# Patient Record
Sex: FEMALE | Race: WHITE
[De-identification: names, ages, dates, MRNs, and addresses within clinical notes are randomized per-mention and may not be internally consistent; named-entity substitution may affect disease eponyms.]

---

## 2020-06-04 PROBLEM — Z00.00 ENCOUNTER FOR PREVENTIVE HEALTH EXAMINATION: Status: ACTIVE | Noted: 2020-06-04

## 2020-06-08 ENCOUNTER — APPOINTMENT (OUTPATIENT)
Dept: NEPHROLOGY | Facility: CLINIC | Age: 70
End: 2020-06-08
Payer: MEDICARE

## 2020-06-08 ENCOUNTER — APPOINTMENT (OUTPATIENT)
Dept: NEPHROLOGY | Facility: CLINIC | Age: 70
End: 2020-06-08

## 2020-06-08 DIAGNOSIS — E66.9 OBESITY, UNSPECIFIED: ICD-10-CM

## 2020-06-08 DIAGNOSIS — Z78.9 OTHER SPECIFIED HEALTH STATUS: ICD-10-CM

## 2020-06-08 DIAGNOSIS — Z87.440 PERSONAL HISTORY OF URINARY (TRACT) INFECTIONS: ICD-10-CM

## 2020-06-08 DIAGNOSIS — Z86.2 PERSONAL HISTORY OF DISEASES OF THE BLOOD AND BLOOD-FORMING ORGANS AND CERTAIN DISORDERS INVOLVING THE IMMUNE MECHANISM: ICD-10-CM

## 2020-06-08 DIAGNOSIS — Z87.39 PERSONAL HISTORY OF OTHER DISEASES OF THE MUSCULOSKELETAL SYSTEM AND CONNECTIVE TISSUE: ICD-10-CM

## 2020-06-08 DIAGNOSIS — Z80.1 FAMILY HISTORY OF MALIGNANT NEOPLASM OF TRACHEA, BRONCHUS AND LUNG: ICD-10-CM

## 2020-06-08 DIAGNOSIS — Z82.49 FAMILY HISTORY OF ISCHEMIC HEART DISEASE AND OTHER DISEASES OF THE CIRCULATORY SYSTEM: ICD-10-CM

## 2020-06-08 DIAGNOSIS — G62.9 POLYNEUROPATHY, UNSPECIFIED: ICD-10-CM

## 2020-06-08 DIAGNOSIS — R79.89 OTHER SPECIFIED ABNORMAL FINDINGS OF BLOOD CHEMISTRY: ICD-10-CM

## 2020-06-08 DIAGNOSIS — E78.5 HYPERLIPIDEMIA, UNSPECIFIED: ICD-10-CM

## 2020-06-08 DIAGNOSIS — E03.9 HYPOTHYROIDISM, UNSPECIFIED: ICD-10-CM

## 2020-06-08 DIAGNOSIS — Z72.3 LACK OF PHYSICAL EXERCISE: ICD-10-CM

## 2020-06-08 PROCEDURE — 99204 OFFICE O/P NEW MOD 45 MIN: CPT | Mod: 95

## 2020-06-08 RX ORDER — RAMIPRIL 5 MG/1
CAPSULE ORAL
Refills: 0 | Status: ACTIVE | COMMUNITY

## 2020-06-08 RX ORDER — GABAPENTIN 600 MG/1
600 TABLET, COATED ORAL AT BEDTIME
Refills: 0 | Status: ACTIVE | COMMUNITY

## 2020-06-08 RX ORDER — CHLORHEXIDINE GLUCONATE 4 %
1000 LIQUID (ML) TOPICAL DAILY
Refills: 0 | Status: ACTIVE | COMMUNITY

## 2020-06-08 RX ORDER — CLONIDINE HYDROCHLORIDE 0.1 MG/1
0.1 TABLET ORAL TWICE DAILY
Refills: 0 | Status: ACTIVE | COMMUNITY

## 2020-06-08 RX ORDER — LEVOTHYROXINE SODIUM 75 UG/1
75 TABLET ORAL DAILY
Refills: 0 | Status: ACTIVE | COMMUNITY

## 2020-06-08 RX ORDER — OXYCODONE HYDROCHLORIDE 10 MG/1
10 TABLET, FILM COATED, EXTENDED RELEASE ORAL DAILY
Refills: 0 | Status: ACTIVE | COMMUNITY

## 2020-06-08 RX ORDER — ATORVASTATIN CALCIUM 40 MG/1
40 TABLET, FILM COATED ORAL DAILY
Refills: 0 | Status: ACTIVE | COMMUNITY

## 2020-06-08 NOTE — CONSULT LETTER
[Dear  ___] : Dear  [unfilled], [Consult Letter:] : I had the pleasure of evaluating your patient, [unfilled]. [Please see my note below.] : Please see my note below. [Consult Closing:] : Thank you very much for allowing me to participate in the care of this patient.  If you have any questions, please do not hesitate to contact me. [Sincerely,] : Sincerely, [FreeTextEntry3] : Dionicio

## 2020-06-08 NOTE — ASSESSMENT
[FreeTextEntry1] : \par Yamilex Chan is a 70-year old woman who has a past medical history significant for diabetes mellitus since 1987 (without diabetic retinopathy though with peripheral neuropathy), hypertension, dyslipidemia, obesity, and sarcoidosis diagnosed on lung biopsy in the 1980's.  I saw Ms. Chan in 2015 for BUN/creatinine levels of 28/1.82.  At the time I commented that I suspected the elevated serum creatinine was due to a combination of her ramipril and furosemide.  She ran out of furosemide, did not take it for a month and repeat studies demonstrated BUN/creatinine levels of 22/1.25 (01/28/2015).  At the time we did a renal ultrasound which demonstrated small appearing kidneys with chronic changes and a hypoechoic area of the upper pole of the right kidney.  Radiology recommended an MRI or CT with and without intravenous contrast.  This was not done.  She reports having a repeat ultrasound of the kidneys, the results of which are not available to me at the moment.\par \par Ms. Chan is here regarding the following BUN/creatinine trend: .27/1.4 (04/12/2019), 27/1.5 (08/14/2019), 20/1.4 (10/15/2019), 24/1.8 (05/21/2020).  Ms. Chan' medications have not changed significantly between October and May.  Amlodipine was started.  Medical marijuana was started.  Clonidine and furosemide were started on 05/21/2020 and do not account for the increase in serum creatinine.  She has taken ibuprofen every other day for years.  She increased the dose from 400 mg every other day to 600 mg every other day in the last 6-9 months. \par \par Her blood pressures have recently been difficult to control and she was recently started on clonidine (and furosemide).  \par \par IMPRESSION:\par \par (1) Stage II- III CKD at baseline (chronic changes on prior renal ultrasound, contribution from ACE I)\par (2) Recent acute increase in the serum creatinine.  I am unsure if this is related to the increase in ibuprofen dose, variations in the blood pressures, BONITA, or other etiology such as a progression of her underlying CKD or effects of long term ibuprofen use.\par (3) Hypertension.  I am unsure if there is underlying renal artery stenosis.  \par (4) Proteinuria.  This has not been quantitated.\par (5) UTI?  Ms. Chan is currently asymptomatic.\par \par RECOMMENDATIONS:\par \par (1) I asked Ms. Chan to stop taking ibuprofen. \par (2) I made no other changes to the medication regimen.\par (3) Repeat renal ultrasound has been ordered. \par (4) I ordered the lab tests noted below. \par (5) I asked Mr. Chan to purchase a blood pressure machine and check her blood pressures twice daily. \par

## 2020-06-08 NOTE — HISTORY OF PRESENT ILLNESS
[Home] : at home, [unfilled] , at the time of the visit. [Medical Office: ([facility])___] : at the medical office located in  [Verbal consent obtained from patient] : the patient, [unfilled] [FreeTextEntry4] : Sepideh Herbert [FreeTextEntry1] : \patrizia Chan is a 70-year old woman who I last saw in April 2015 for her chronic kidney disease.  Her past medical history is significant for diabetes mellitus since 1987 (without diabetic retinopathy though with peripheral neuropathy), hypertension, dyslipidemia, obesity, and sarcoidosis diagnosed on lung biopsy in the 1980's.  Her BUN/creatinine levels have trended as follows:  22/1.25 (01/28/2015) after stopping furosemide, 28/1.82......., 27/1.4 (04/12/2019), 27/1.5 (08/14/2019), 20/1.4 (10/15/2019), 24/1.8 (05/21/2020).  She is currently taking ramipril 20 mg twice daily and furosemide 20 mg once daily in addition to the medications outlined below.  The ramipril dose has not changed in the past year.  Furosemide was started on 05/20/2020 for ankle edema.  Clonidine 0.1 mg twice daily was also started on that day.  Ms. Chan takes She takes ibuprofen 600 mg around once every other day.  Currently has some lower back pain though otherwise is without complaints.

## 2020-06-08 NOTE — REVIEW OF SYSTEMS
[Lower Ext Edema] : lower extremity edema [As Noted in HPI] : as noted in HPI [Negative] : Heme/Lymph [Chest Pain] : no chest pain [Palpitations] : no palpitations [Leg Claudication] : no intermittent leg claudication [FreeTextEntry3] : wears eyeglasses.  Denies having diabetic retinopathy [FreeTextEntry5] : swelling in the ankles [FreeTextEntry6] : D [FreeTextEntry8] : nocturia x 1 [de-identified] : "I'm always thirsty".  FSG readings have recently been very high oin the mornings.

## 2020-07-07 ENCOUNTER — APPOINTMENT (OUTPATIENT)
Dept: NEPHROLOGY | Facility: CLINIC | Age: 70
End: 2020-07-07

## 2020-07-20 LAB
ANION GAP SERPL CALC-SCNC: 11 MMOL/L
BUN SERPL-MCNC: 39 MG/DL
CALCIUM SERPL-MCNC: 9 MG/DL
CHLORIDE SERPL-SCNC: 111 MMOL/L
CO2 SERPL-SCNC: 21 MMOL/L
CREAT SERPL-MCNC: 1.95 MG/DL
GLUCOSE SERPL-MCNC: 140 MG/DL
POTASSIUM SERPL-SCNC: 5.5 MMOL/L
SODIUM SERPL-SCNC: 143 MMOL/L

## 2020-07-20 RX ORDER — AMLODIPINE BESYLATE 5 MG/1
5 TABLET ORAL DAILY
Refills: 0 | Status: DISCONTINUED | COMMUNITY
End: 2020-07-20

## 2020-07-20 RX ORDER — FUROSEMIDE 20 MG/1
20 TABLET ORAL DAILY
Refills: 0 | Status: DISCONTINUED | COMMUNITY
End: 2020-07-20

## 2020-07-21 ENCOUNTER — APPOINTMENT (OUTPATIENT)
Dept: NEPHROLOGY | Facility: CLINIC | Age: 70
End: 2020-07-21
Payer: MEDICARE

## 2020-07-21 DIAGNOSIS — N17.9 ACUTE KIDNEY FAILURE, UNSPECIFIED: ICD-10-CM

## 2020-07-21 DIAGNOSIS — E87.5 HYPERKALEMIA: ICD-10-CM

## 2020-07-21 PROCEDURE — 99214 OFFICE O/P EST MOD 30 MIN: CPT | Mod: 95

## 2020-07-21 RX ORDER — MULTIVIT-MIN/FA/LYCOPEN/LUTEIN .4-300-25
TABLET ORAL
Refills: 0 | Status: ACTIVE | COMMUNITY

## 2020-07-21 NOTE — REVIEW OF SYSTEMS
[Lower Ext Edema] : lower extremity edema [As Noted in HPI] : as noted in HPI [Negative] : Heme/Lymph [Chest Pain] : no chest pain [Palpitations] : no palpitations [Leg Claudication] : no intermittent leg claudication [FreeTextEntry2] : dry mouth [FreeTextEntry3] : wears eyeglasses.  Denies having diabetic retinopathy [FreeTextEntry5] : ankle edema [de-identified] : bilateral toe numbness has been present for years. [FreeTextEntry7] : diarrhea with headache and nausea a couple of weeks ago, lasted 2.5 days and resolved. [FreeTextEntry8] : nocturia x 1 [de-identified] : "I'm always thirsty".  FSG readings have recently been "oretty good", running from  mg/dL in the mornings.

## 2020-07-21 NOTE — ASSESSMENT
[FreeTextEntry1] : \par Yamilex Chan is a 70-year old woman who has a past medical history significant for diabetes mellitus since 1987 (without diabetic retinopathy though with peripheral neuropathy), hypertension, dyslipidemia, obesity, and sarcoidosis diagnosed on lung biopsy in the 1980's.  I saw Ms. Chan in 2015 for BUN/creatinine levels of 28/1.82.  At the time I commented that I suspected the elevated serum creatinine was due to a combination of her ramipril and furosemide.  She ran out of furosemide, did not take it for a month and repeat studies demonstrated BUN/creatinine levels of 22/1.25 (01/28/2015).  At the time we did a renal ultrasound which demonstrated small appearing kidneys with chronic changes and a hypoechoic area of the upper pole of the right kidney.  Radiology recommended an MRI or CT with and without intravenous contrast.  This was not done.  \par \par Ms. Chan is here regarding the following BUN/creatinine trend: .27/1.4 (04/12/2019), 27/1.5 (08/14/2019), 20/1.4 (10/15/2019), 24/1.8 (05/21/2020), 35/2.23 (06/25/2020). Ms. Chan' medications have not changed significantly between October and May.  Amlodipine was started.  Medical marijuana was started.  Clonidine and furosemide were started on 05/21/2020 and do not account for the increase in serum creatinine in May, though are likely contributors to the serum creatinine of 2.23 mg/dL in June 2020.   She had been taking  ibuprofen every other day for years.  She had increased the dose from 400 mg every other day to 600 mg every other day in the last 6-9 months though discontinued it following the recent elevated serum creatinine. Repeat BUN/creatinine levels from 07/17/2020 were 39/1.95.  \par \par Her blood pressures have recently been difficult to control and she was recently started on clonidine.  The recent blood pressures since starting clonidine have been around 127-145/65-67.  It has improved. \par \par IMPRESSION:\par \par (1) Stage II- III CKD with NANDO (chronic changes on prior renal ultrasound, contribution from  NSAIDS, ACE I).  The urine protein to creatinine ratio is 1.09 grams per gram.  The urinalysis demonstrated a few white cells with 2+ bacteria (she has been asymptomatic).  The renal ultrasound of 06/24/2020 demonstrated a right kidney measuring 10.1 cm and left kidney measuring 8.8 cm with bilateral cortical thinning with lobulation and bilateral increased cortical echogenicity.  The left kidney is small (it should be larger than the right kidney).  I suspect that the underlying CKD is related to the use of NSAIDS for years, +/- renal artery stenosis versus embolic/vascular disease, with contributions from the ACE inhibitor. I can not rule out a developing proteinuric renal disease. \par \par (2) NANDO.  Likely related to the addition of furosemide and use of ibuprofen.  Currently improving off of these medications. \par \par (3) Hypertension.  I am unsure if there is underlying renal artery stenosis.  The small left kidney suggests that this is a possibility.  The secondary workup has included serum aldosterone (7.6 ng/dL). This is not  hyper aldosteronism.\par \par (4) Renal mass.  This was not present on the recent ultrasound. \par \par (5) Hyperkalemia.  Serum potassium was 5.5 meq per liter (glucose 140 mg/dL).\par \par PLAN:\par \par (1) Limit high potassium foods. We reviewed them. \par (2) Repeat BMP in 2 weeks.\par (3) Follow the blood pressures at home.\par (4) Continue the current medication regimen.\par \par If the repeat labs are stable or improved- Ms. Chan will return to me in 3 months with a CMP, CBC, phosphorous, PRA with aldosterone, urinalysis, and repeat random urine for total protein and creatinine in addition to a nephrotic range proteinuria workup.

## 2020-07-21 NOTE — CONSULT LETTER
[Dear  ___] : Dear  [unfilled], [Consult Letter:] : I had the pleasure of evaluating your patient, [unfilled]. [Please see my note below.] : Please see my note below. [Sincerely,] : Sincerely, [Consult Closing:] : Thank you very much for allowing me to participate in the care of this patient.  If you have any questions, please do not hesitate to contact me. [FreeTextEntry3] : Dionicio

## 2020-07-21 NOTE — HISTORY OF PRESENT ILLNESS
[Medical Office: ([facility])___] : at the medical office located in  [Verbal consent obtained from patient] : the patient, [unfilled] [Home] : at home, [unfilled] , at the time of the visit. [FreeTextEntry4] : Sepideh Herbert [FreeTextEntry1] : \par Yamilex Chan is a 70-year old woman who I  saw in April 2015 for her chronic kidney disease.  Her past medical history is significant for diabetes mellitus since 1987 (without diabetic retinopathy though with peripheral neuropathy), hypertension, dyslipidemia, obesity, and sarcoidosis diagnosed on lung biopsy in the 1980's.  Her BUN/creatinine levels have trended as follows:  22/1.25 (01/28/2015) after stopping furosemide, 28/1.82......., 27/1.4 (04/12/2019), 27/1.5 (08/14/2019), 20/1.4 (10/15/2019), 24/1.8 (05/21/2020).  She returned to me in June 2020 because of the increase in the serum creatinine.\par \par She is currently taking ramipril 20 mg twice daily and furosemide 20 mg once daily in addition to the medications outlined below.  The ramipril dose has not changed in the past year.  Furosemide was started on 05/20/2020 for ankle edema.  Clonidine 0.1 mg twice daily was also started on that day.  Ms. Chan was taking   ibuprofen 600 mg around once every other day for her lower back pain.  Following a repeat serum creatinine of 2.23 mg/dL, the NSAIDS and furosemide were discontinued.\par \par I am seeing Ms. Chan today via telehealth video conferencing.  She is feeling well.  Her right ankle is "a bit swollen".  She is no longer taking furosemide.  She has lost a couple of pounds.  She is taking no ibuprofen. She is using medical marijuana.

## 2020-10-23 RX ORDER — DULOXETINE HYDROCHLORIDE 20 MG/1
20 CAPSULE, DELAYED RELEASE ORAL DAILY
Refills: 0 | Status: DISCONTINUED | COMMUNITY
End: 2020-10-23

## 2020-10-27 ENCOUNTER — APPOINTMENT (OUTPATIENT)
Dept: NEPHROLOGY | Facility: CLINIC | Age: 70
End: 2020-10-27
Payer: MEDICARE

## 2020-10-27 DIAGNOSIS — I10 ESSENTIAL (PRIMARY) HYPERTENSION: ICD-10-CM

## 2020-10-27 DIAGNOSIS — R80.9 PROTEINURIA, UNSPECIFIED: ICD-10-CM

## 2020-10-27 DIAGNOSIS — E11.9 TYPE 2 DIABETES MELLITUS W/OUT COMPLICATIONS: ICD-10-CM

## 2020-10-27 DIAGNOSIS — N18.30 CHRONIC KIDNEY DISEASE, STAGE 3 UNSPECIFIED: ICD-10-CM

## 2020-10-27 PROCEDURE — 99214 OFFICE O/P EST MOD 30 MIN: CPT | Mod: 95

## 2020-10-27 RX ORDER — SEMAGLUTIDE 0.68 MG/ML
INJECTION, SOLUTION SUBCUTANEOUS
Refills: 0 | Status: ACTIVE | COMMUNITY

## 2020-10-27 RX ORDER — INSULIN GLARGINE 300 U/ML
300 INJECTION, SOLUTION SUBCUTANEOUS DAILY
Refills: 0 | Status: ACTIVE | COMMUNITY

## 2020-10-27 NOTE — ASSESSMENT
[FreeTextEntry1] : \par Yamilex Chan is a 70-year old woman who has a past medical history significant for diabetes mellitus since 1987 (without diabetic retinopathy though with peripheral neuropathy), hypertension, dyslipidemia, obesity, and sarcoidosis diagnosed on lung biopsy in the 1980's.  I saw Ms. Chan in 2015 for BUN/creatinine levels of 28/1.82.  At the time I commented that I suspected the elevated serum creatinine was due to a combination of her ramipril and furosemide.  She ran out of furosemide, did not take it for a month and repeat studies demonstrated BUN/creatinine levels of 22/1.25 (01/28/2015).  At the time we did a renal ultrasound which demonstrated small appearing kidneys with chronic changes and a hypoechoic area of the upper pole of the right kidney.  Radiology recommended an MRI or CT with and without intravenous contrast.  This was not done.  \par \par Ms. Chan'  BUN/creatinine levels have trended as follows: .27/1.4 (04/12/2019), 27/1.5 (08/14/2019), 20/1.4 (10/15/2019), 24/1.8 (05/21/2020), 35/2.23 (06/25/2020),  39/1.95 (07/17/2020), 31/1.8 (08/11/2020).  I suspect the increase in the serum creatinine to 2.23 mg per dL was related to use of ibuprofen and furosemide.  Both have been stopped since.  Remember, Ms. Chan did take NSAIDS for years.  This may be a contributor to the underlying CKD.\par \par Her blood pressures have recently been "good, ranging from 111/62 to 145/65.   \par \par IMPRESSION:\par \par (1) Stage III CKD with NANDO (chronic changes on prior renal ultrasound, contribution from  NSAIDS, ACE I).  The last urine protein to creatinine ratio was1.09 grams per gram.  The urinalysis demonstrated a few white cells with 2+ bacteria (she has been asymptomatic).  The renal ultrasound of 06/24/2020 demonstrated a right kidney measuring 10.1 cm and left kidney measuring 8.8 cm with bilateral cortical thinning with lobulation and bilateral increased cortical echogenicity.  The left kidney is small (it should be larger than the right kidney).  I suspect that the underlying CKD is related to the use of NSAIDS for years, +/- renal artery stenosis versus embolic/vascular disease, with contributions from the ACE inhibitor. I can not rule out a developing proteinuric renal disease or a contribution from the long standing diabetes mellitus. \par \par (2) NANDO.  Resolved.\par \par (3) Hypertension.  I am unsure if there is underlying renal artery stenosis.  The small left kidney suggests that this is a possibility.  The secondary workup has included serum aldosterone (7.6 ng/dL). This is not  hyper aldosteronism.\par \par (4) Renal mass.  This was not present on the recent ultrasound. \par \par (5) Hyperkalemia.  Serum potassium was 5.4 meq per liter (glucose 124 mg/dL).\par \par (6) DM.  Poor control this past summer (HbA1c 7.9%).  This has reportedly improved. \par \par PLAN:\par \par (1) I made no changes in the medication regimen.\par (2) See Dr. Aleman.  Ms. Chan has been having chronic nausea which may be related to the use of Ozempic. \par (3) Follow the blood pressures at home.  Ms. Chan should have her blood pressure monitored checked for accuracy. \par \par Ms. Chan will return to me in 6 months with a CMP, CBC, phosphorous, PRA with aldosterone, urinalysis, and repeat random urine for total protein and creatinine in addition to a nephrotic range proteinuria workup.

## 2020-10-27 NOTE — REVIEW OF SYSTEMS
[Chest Pain] : no chest pain [Palpitations] : no palpitations [Leg Claudication] : no intermittent leg claudication [Lower Ext Edema] : lower extremity edema [Joint Pain] : joint pain [Itching] : itching [As Noted in HPI] : as noted in HPI [Negative] : Heme/Lymph [FreeTextEntry3] : wears eyeglasses.  Denies having diabetic retinopathy [FreeTextEntry8] : nocturia x 1 [de-identified] : Itching "all over" [de-identified] : bilateral toe numbness has been present for years. [de-identified] : .

## 2020-10-27 NOTE — HISTORY OF PRESENT ILLNESS
[Home] : at home, [unfilled] , at the time of the visit. [Medical Office: ([facility])___] : at the medical office located in  [Verbal consent obtained from patient] : the patient, [unfilled] [FreeTextEntry4] : Sepideh Herbert [FreeTextEntry1] : \par Yamilex Chan is a 70-year old woman who I  saw in April 2015 for her chronic kidney disease.  Her past medical history is significant for diabetes mellitus since 1987 (without diabetic retinopathy though with peripheral neuropathy), hypertension, dyslipidemia, obesity, and sarcoidosis diagnosed on lung biopsy in the 1980's.  Her BUN/creatinine levels have trended as follows:  22/1.25 (01/28/2015) after stopping furosemide, 28/1.82......., 27/1.4 (04/12/2019), 27/1.5 (08/14/2019), 20/1.4 (10/15/2019), 24/1.8 (05/21/2020).  She returned to me in June 2020 because of the increase in the serum creatinine.\par \par I am seeing Ms. Chan via telehealth video monitoring today.  She was  taking ramipril 20 mg twice daily and furosemide 20 mg once daily in addition to the medications outlined below.  The ramipril dose has not changed in over a  year.  Furosemide was started on 05/20/2020 for ankle edema.  Clonidine 0.1 mg twice daily was also started on that day.  Ms. Chan was taking  ibuprofen 600 mg around once every other day for her lower back pain.  Following a repeat serum creatinine of 2.23 mg/dL, the NSAIDS and furosemide were discontinued.\par \par I am seeing Ms. Chan today via telehealth video conferencing.  She is feeling well. She has been exercising and cut back on food portions. She has lost 20 pounds. She remains off of furosemide.  Her diabetic regimen has been modified.  Her diabetes mellitus is "under control".  She see's Dr. Luz Marina Aleman of endocrinology.   Ms. Chan states that she is "nauseas all the time".  Her new medications include Ozempic and Toujeo. \par

## 2020-10-27 NOTE — CONSULT LETTER
[Dear  ___] : Dear  [unfilled], [Consult Letter:] : I had the pleasure of evaluating your patient, [unfilled]. [Please see my note below.] : Please see my note below. [Consult Closing:] : Thank you very much for allowing me to participate in the care of this patient.  If you have any questions, please do not hesitate to contact me. [Sincerely,] : Sincerely, [FreeTextEntry3] : Dionicio [DrNichole  ___] : Dr. THOMAS

## 2020-12-23 PROBLEM — Z87.440 HISTORY OF URINARY TRACT INFECTION: Status: RESOLVED | Noted: 2020-06-08 | Resolved: 2020-12-23

## 2021-04-26 ENCOUNTER — APPOINTMENT (OUTPATIENT)
Dept: NEPHROLOGY | Facility: CLINIC | Age: 71
End: 2021-04-26

## 2023-10-04 ENCOUNTER — TRANSCRIPTION ENCOUNTER (OUTPATIENT)
Age: 73
End: 2023-10-04

## 2023-10-10 VITALS
HEART RATE: 62 BPM | SYSTOLIC BLOOD PRESSURE: 155 MMHG | TEMPERATURE: 98 F | WEIGHT: 175.05 LBS | HEIGHT: 62 IN | RESPIRATION RATE: 16 BRPM | OXYGEN SATURATION: 100 % | DIASTOLIC BLOOD PRESSURE: 93 MMHG

## 2023-10-10 RX ORDER — CHLORHEXIDINE GLUCONATE 213 G/1000ML
1 SOLUTION TOPICAL ONCE
Refills: 0 | Status: COMPLETED | OUTPATIENT
Start: 2023-10-13 | End: 2023-10-13

## 2023-10-10 NOTE — H&P ADULT - PSYCHIATRIC
Expected Date Of Service: 09/14/2023 Bill For Surgical Tray: no Billing Type: Third-Party Bill Performing Laboratory: 0 normal/normal affect/alert and oriented x3/normal behavior

## 2023-10-10 NOTE — H&P ADULT - ASSESSMENT
72 yo F with PMHx of HTN, hypothyroidism, DM2, Afib (on Xarelto, last dose 10/10), hypothyroidism, CKD stage IV, sarcoidosis (in remission x30 years), and chronic back pain who presented to [facility] cardiac cath lab for high risk PCI/rota in light of known LAD disease and CCS class II symptoms     -ASA 3, Mallampati 3  -VSS  -Pt not on home aspirin; loaded with aspirin 81mg and Plavix 600mg PO x1.  H&h 10.4/33.8. Denies any recent bleeding.   -Pre cath IVF Hydration: NS 250cc bolus then c/w maintenance NS @ 75cc/hr x 2hrs. Euvolemic on exam.   -Pre cath consented  -Suitable for moderate sedation    Risks & benefits of procedure and alternative therapy have been explained to the patient including but not limited to: allergic reaction, bleeding w/possible need for blood transfusion, infection, renal and vascular compromise, limb damage, arrhythmia, stroke, vessel dissection/perforation, Myocardial infarction, emergent CABG. Informed consent obtained and in chart.             72 yo F with PMHx of HTN, hypothyroidism, DM2, Afib (on Xarelto, last dose 10/10), hypothyroidism, CKD stage IV, sarcoidosis (in remission x30 years), and chronic back pain who presented to [facility] cardiac cath lab for high risk PCI/rota in light of known LAD disease and CCS class II symptoms     -ASA 3, Mallampati 3  -VSS  -Pt not on home aspirin; loaded with aspirin 81mg and Plavix 600mg PO x1.  H&h 10.4/33.8. Denies any recent bleeding.   -Pre cath IVF Hydration: NS 250cc bolus then c/w maintenance NS @ 75cc/hr x 2hrs. Euvolemic on exam.   -Pre cath consented  -Suitable for moderate sedation    Risks & benefits of procedure and alternative therapy have been explained to the patient including but not limited to: allergic reaction, bleeding w/possible need for blood transfusion, infection, renal and vascular compromise, limb damage, arrhythmia, stroke, vessel dissection/perforation, Myocardial infarction, emergent CABG. Informed consent obtained and in chart.             74 yo F with PMHx of HTN, hypothyroidism, DM2, Afib (on Xarelto, last dose 10/10), hypothyroidism, CKD stage IV, sarcoidosis (in remission x30 years), and chronic back pain who presented to [facility] cardiac cath lab for high risk PCI/rota in light of known LAD disease and CCS class II symptoms     -ASA 3, Mallampati 3  -VSS  -Pt not on home aspirin; loaded with aspirin 81mg and Plavix 600mg PO x1.  H&h 10.4/33.8. Denies any recent bleeding.   -Pre cath IVF Hydration: NS 250cc bolus then c/w maintenance NS @ 75cc/hr x 2hrs. Euvolemic on exam.   -Pre cath consented  -Suitable for moderate sedation    Risks & benefits of procedure and alternative therapy have been explained to the patient including but not limited to: allergic reaction, bleeding w/possible need for blood transfusion, infection, renal and vascular compromise, limb damage, arrhythmia, stroke, vessel dissection/perforation, Myocardial infarction, emergent CABG. Informed consent obtained and in chart.             72 yo F with PMHx of HTN, hypothyroidism, DM2, Afib (on Xarelto, last dose 10/10), hypothyroidism, CKD stage IV (baseline Cr 2-2.2), sarcoidosis (in remission x30 years), and chronic back pain who presented to [facility] cardiac cath lab for high risk PCI/rota in light of known LAD disease and CCS class II symptoms     -ASA 3, Mallampati 3  -VSS  -Pt not on home aspirin; loaded with aspirin 81mg and Plavix 600mg PO x1.  H&h 10.4/33.8 (at baseline). Denies any recent bleeding.   -Pre cath IVF Hydration: NS 250cc bolus then c/w maintenance NS @ 75cc/hr x 2hrs. Euvolemic on exam.   -Pre cath consented  -Suitable for moderate sedation    Risks & benefits of procedure and alternative therapy have been explained to the patient including but not limited to: allergic reaction, bleeding w/possible need for blood transfusion, infection, renal and vascular compromise, limb damage, arrhythmia, stroke, vessel dissection/perforation, Myocardial infarction, emergent CABG. Informed consent obtained and in chart.             74 yo F with PMHx of HTN, hypothyroidism, DM2, Afib (on Xarelto, last dose 10/10), hypothyroidism, CKD stage IV (baseline Cr 2-2.2), sarcoidosis (in remission x30 years), and chronic back pain who presented to [facility] cardiac cath lab for high risk PCI/rota in light of known LAD disease and CCS class II symptoms     -ASA 3, Mallampati 3  -VSS  -Pt not on home aspirin; loaded with aspirin 81mg and Plavix 600mg PO x1.  H&h 10.4/33.8 (at baseline). Denies any recent bleeding.   -Pre cath IVF Hydration: NS 250cc bolus then c/w maintenance NS @ 75cc/hr x 2hrs. Euvolemic on exam.   -Pre cath consented  -Suitable for moderate sedation    Risks & benefits of procedure and alternative therapy have been explained to the patient including but not limited to: allergic reaction, bleeding w/possible need for blood transfusion, infection, renal and vascular compromise, limb damage, arrhythmia, stroke, vessel dissection/perforation, Myocardial infarction, emergent CABG. Informed consent obtained and in chart.             74 yo F with PMHx of HTN, hypothyroidism, DM2, Afib (on Xarelto, last dose 10/10), hypothyroidism, CKD stage IV (baseline Cr 2-2.2), sarcoidosis (in remission x30 years), and chronic back pain who presented to [facility] cardiac cath lab for high risk PCI/rota in light of known LAD disease and CCS class II symptoms     -ASA 3, Mallampati 3  -VSS  -Pt not on home aspirin; loaded with aspirin 81mg and Plavix 600mg PO x1.  H&h 10.4/33.8 (at baseline). Denies any recent bleeding.   -Pre cath IVF Hydration: NS 250cc bolus then c/w maintenance NS @ 75cc/hr x 2hrs. Euvolemic on exam.   -Pre cath consented  -Suitable for moderate sedation    Risks & benefits of procedure and alternative therapy have been explained to the patient including but not limited to: allergic reaction, bleeding w/possible need for blood transfusion, infection, renal and vascular compromise, limb damage, arrhythmia, stroke, vessel dissection/perforation, Myocardial infarction, emergent CABG. Informed consent obtained and in chart.             72 yo F with PMHx of HTN, hypothyroidism, DM2, Afib (on Xarelto, last dose 10/10), hypothyroidism, CKD stage IV (baseline Cr 2-2.2), sarcoidosis (in remission x30 years), and chronic back pain who presented to [facility] cardiac cath lab for high risk PCI/rota in light of known LAD disease and CCS class II symptoms     -ASA 3, Mallampati 3  -VSS  -Pt not on home aspirin; loaded with aspirin 81mg and Plavix 600mg PO x1.  H&h 10.4/33.8 (at baseline). Denies any recent bleeding.   -Pre cath IVF Hydration: NS 250cc bolus then c/w maintenance NS @ 150cc/hr x 2hrs for Cr 2.32 as discussed with IF fellow. Euvolemic on exam.   -Pre cath consented  -Suitable for moderate sedation    Risks & benefits of procedure and alternative therapy have been explained to the patient including but not limited to: allergic reaction, bleeding w/possible need for blood transfusion, infection, renal and vascular compromise, limb damage, arrhythmia, stroke, vessel dissection/perforation, Myocardial infarction, emergent CABG. Informed consent obtained and in chart.             74 yo F with PMHx of HTN, hypothyroidism, DM2, Afib (on Xarelto, last dose 10/10), hypothyroidism, CKD stage IV (baseline Cr 2-2.2), sarcoidosis (in remission x30 years), and chronic back pain who presented to [facility] cardiac cath lab for high risk PCI/rota in light of known LAD disease and CCS class II symptoms     -ASA 3, Mallampati 3  -VSS  -Pt not on home aspirin; loaded with aspirin 81mg and Plavix 600mg PO x1.  H&h 10.4/33.8 (at baseline). Denies any recent bleeding.   -Pre cath IVF Hydration: NS 250cc bolus then c/w maintenance NS @ 150cc/hr x 2hrs for Cr 2.32 as discussed with IF fellow. Euvolemic on exam.   -Pre cath consented  -Suitable for moderate sedation    Risks & benefits of procedure and alternative therapy have been explained to the patient including but not limited to: allergic reaction, bleeding w/possible need for blood transfusion, infection, renal and vascular compromise, limb damage, arrhythmia, stroke, vessel dissection/perforation, Myocardial infarction, emergent CABG. Informed consent obtained and in chart.             74 yo F with PMHx of HTN, hypothyroidism, DM2, Afib (on Xarelto, last dose 10/10), hypothyroidism, CKD stage IV (baseline Cr 2-2.2), sarcoidosis (in remission x30 years), and chronic back pain who presented to [facility] cardiac cath lab for high risk PCI/rota in light of known LAD disease and CCS class II symptoms     -ASA 3, Mallampati 3  -VSS  -Pt not on home aspirin; loaded with aspirin 81mg and Plavix 600mg PO x1.  H&h 10.4/33.8 (at baseline). Denies any recent bleeding.   -Pre cath IVF Hydration: NS 250cc bolus then c/w maintenance NS @ 150cc/hr x 2hrs for Cr 2.32 as discussed with IF fellow. Euvolemic on exam.   -Pre cath consented  -Suitable for moderate sedation    Risks & benefits of procedure and alternative therapy have been explained to the patient including but not limited to: allergic reaction, bleeding w/possible need for blood transfusion, infection, renal and vascular compromise, limb damage, arrhythmia, stroke, vessel dissection/perforation, Myocardial infarction, emergent CABG. Informed consent obtained and in chart.

## 2023-10-10 NOTE — H&P ADULT - NSICDXPASTMEDICALHX_GEN_ALL_CORE_FT
PAST MEDICAL HISTORY:  Afib     Diabetes     HTN (hypertension)     Hypothyroid     Lower extremity edema     Stage 4 chronic kidney disease      PAST MEDICAL HISTORY:  Afib     Diabetes     HTN (hypertension)     Hypothyroid     Lower extremity edema     Sarcoidosis     Stage 4 chronic kidney disease

## 2023-10-10 NOTE — H&P ADULT - HISTORY OF PRESENT ILLNESS
Cardiologist: Dr. Urrutia  Pharmacy:   Escort:    74 yo F with PMHx of HTN, hypothyroidism, DM2, Afib, hypothyroidism, CKD stage IV, chronic back pain who presented to the [facility] ER complaining of exertional shortness of breath and getting an abnormal stress test with Dr. Urrutia. Pt was admitted and is now s/p C 10/4/23 that showed a heavily calcified LAD lesion with 75% mid stenosis with calcification noted. Pt ___ denies palpitations, orthopnea, PND, leg edema, n/v/d, fever, chills, hematochezia, hematemesis, melena, BRBPR and other symptoms.      In light of pt risk factors, abnormal prior cath and CCS class ____ symptoms, pt was referred to [facility] for high risk PCI/rota with Dr. Calderon.          Cardiologist: Dr. Urrutia  Pharmacy:   Escort:    72 yo F with PMHx of HTN, hypothyroidism, DM2, Afib, hypothyroidism, CKD stage IV, chronic back pain who presented to the [facility] ER complaining of exertional shortness of breath and getting an abnormal stress test with Dr. Urrutia. Pt was admitted and is now s/p C 10/4/23 that showed a heavily calcified LAD lesion with 75% mid stenosis with calcification noted. Pt ___ denies palpitations, orthopnea, PND, leg edema, n/v/d, fever, chills, hematochezia, hematemesis, melena, BRBPR and other symptoms.      In light of pt risk factors, abnormal prior cath and CCS class ____ symptoms, pt was referred to [facility] for high risk PCI/rota with Dr. Calderon.          Cardiologist: Dr. Urrutia  Pharmacy:   Escort:    72 yo F with PMHx of HTN, hypothyroidism, DM2, Afib, hypothyroidism, CKD stage IV, chronic back pain who presented to the [facility] ER complaining of exertional shortness of breath and getting an abnormal stress test with Dr. Urrutia. Pt was admitted and is now s/p C 10/4/23 that showed a heavily calcified LAD lesion with 75% mid stenosis with calcification noted. Pt ___ denies palpitations, orthopnea, PND, leg edema, n/v/d, fever, chills, hematochezia, hematemesis, melena, BRBPR and other symptoms.      In light of pt risk factors, abnormal prior cath and CCS class ____ symptoms, pt was referred to [facility] for high risk PCI/rota with Dr. Calderon.          Cardiologist: Dr. Urrutia  Pharmacy: [place] (in chart)  Escort:     74 yo F with PMHx of HTN, hypothyroidism, DM2, Afib (on Xarelto, last dose 10/10), hypothyroidism, CKD stage IV, sarcoidosis (in remission x30 years), and chronic back pain who presented to the [facility] ER complaining of exertional shortness of breath and getting an abnormal stress test with Dr. Urrutia. Pt was admitted and is now s/p [facility] 10/4/23 that showed a heavily calcified LAD lesion with 75% mid stenosis with calcification noted. Pt denies any palpitations, orthopnea, PND, leg edema, n/v/d, fever, chills, hematochezia, hematemesis, melena, BRBPR and other symptoms.     In light of pt risk factors, abnormal prior cath and CCS class II symptoms, pt was referred to [facility] for high risk PCI/rota with Dr. Nunez.          Cardiologist: Dr. Urrutia  Pharmacy: [place] (in chart)  Escort:     74 yo F with PMHx of HTN, hypothyroidism, DM2, Afib (on Xarelto, last dose 10/10), hypothyroidism, CKD stage IV, sarcoidosis (in remission x30 years), and chronic back pain who presented to the [facility] ER complaining of exertional shortness of breath and getting an abnormal stress test with Dr. Urrutia. Pt was admitted and is now s/p [facility] 10/4/23 that showed a heavily calcified LAD lesion with 75% mid stenosis with calcification noted. Pt denies any palpitations, orthopnea, PND, leg edema, n/v/d, fever, chills, hematochezia, hematemesis, melena, BRBPR and other symptoms.     In light of pt risk factors, abnormal prior cath and CCS class II symptoms, pt was referred to [facility] for high risk PCI/rota with Dr. Nunez.          Cardiologist: Dr. Urrutia  Pharmacy: [place] (in chart)  Escort:     72 yo F with PMHx of HTN, hypothyroidism, DM2, Afib (on Xarelto, last dose 10/10), hypothyroidism, CKD stage IV, sarcoidosis (in remission x30 years), and chronic back pain who presented to the [facility] ER complaining of exertional shortness of breath and getting an abnormal stress test with Dr. Urrutia. Pt was admitted and is now s/p [facility] 10/4/23 that showed a heavily calcified LAD lesion with 75% mid stenosis with calcification noted. Pt denies any palpitations, orthopnea, PND, leg edema, n/v/d, fever, chills, hematochezia, hematemesis, melena, BRBPR and other symptoms.     In light of pt risk factors, abnormal prior cath and CCS class II symptoms, pt was referred to [facility] for high risk PCI/rota with Dr. Nunez.          Cardiologist: Dr. Urrutia  Pharmacy: [place] (in chart)  Escort:     72 yo F with PMHx of HTN, hypothyroidism, DM2, Afib (on Xarelto, last dose 10/10), hypothyroidism, CKD stage IV (baseline 2-2.2), sarcoidosis (in remission x30 years), and chronic back pain who presented to the [facility] ER complaining of exertional shortness of breath and getting an abnormal stress test with Dr. Urrutia. Pt was admitted and is now s/p C 10/4/23 that showed a heavily calcified LAD lesion with 75% mid stenosis with calcification noted. Pt denies any palpitations, orthopnea, PND, leg edema, n/v/d, fever, chills, hematochezia, hematemesis, melena, BRBPR and other symptoms.     In light of pt risk factors, abnormal prior cath and CCS class II symptoms, pt was referred to [facility] for high risk PCI/rota with Dr. Nunez.          Cardiologist: Dr. Urrutia  Pharmacy: [place] (in chart)  Escort:     72 yo F with PMHx of HTN, hypothyroidism, DM2, Afib (on Xarelto, last dose 10/10), hypothyroidism, CKD stage IV (baseline 2-2.2), sarcoidosis (in remission x30 years), and chronic back pain who presented to the [facility] ER complaining of exertional shortness of breath and getting an abnormal stress test with Dr. Urrutia. Pt was admitted and is now s/p C 10/4/23 that showed a heavily calcified LAD lesion with 75% mid stenosis with calcification noted. Pt denies any palpitations, orthopnea, PND, leg edema, n/v/d, fever, chills, hematochezia, hematemesis, melena, BRBPR and other symptoms.     In light of pt risk factors, abnormal prior cath and CCS class II symptoms, pt was referred to [facility] for high risk PCI/rota with Dr. Nunez.          Cardiologist: Dr. Urrutia  Pharmacy: [place] (in chart)  Escort:     72 yo F with PMHx of HTN, hypothyroidism, DM2, Afib (on Xarelto, last dose 10/10), hypothyroidism, CKD stage IV (baseline 2-2.2), sarcoidosis (in remission x30 years), and chronic back pain who presented to the [facility] ER complaining of exertional shortness of breath and getting an abnormal stress test with Dr. Urrutia. Pt was admitted and is now s/p C 10/4/23 that showed a heavily calcified LAD lesion with 75% mid stenosis with calcification noted. Pt denies any palpitations, orthopnea, PND, leg edema, n/v/d, fever, chills, hematochezia, hematemesis, melena, BRBPR and other symptoms.     In light of pt risk factors, abnormal prior cath and CCS class II symptoms, pt was referred to [facility] for high risk PCI/rota with Dr. Nunez.          Cardiologist: Dr. Urrutia  Pharmacy: [place] (in chart)  Escort:     74 yo F with PMHx of HTN, hypothyroidism, DM2, Afib (on Xarelto, last dose 10/10), hypothyroidism, CKD stage IV (baseline 2-2.2), sarcoidosis (in remission x30 years), and chronic back pain who presented to the [facility] ER complaining of exertional shortness of breath and getting an abnormal stress test with Dr. Urrutia. Pt was admitted and is now s/p C 10/4/23 that showed a heavily calcified LAD lesion with 75% mid stenosis with calcification noted. Pt denies any palpitations, orthopnea, PND, leg edema, n/v/d, fever, chills, hematochezia, hematemesis, melena, BRBPR and other symptoms.     In light of pt risk factors, abnormal prior cath and CCS class II symptoms, pt was referred to [facility] for high risk PCI/rota with Dr. Nunez.  Cardiologist: Dr. Urrutia  Pharmacy: [place] (in chart)  Escort:     74 yo F with PMHx of HTN, hypothyroidism, DM2, Afib (on Xarelto, last dose 10/10), hypothyroidism, CKD stage IV (baseline 2-2.2), sarcoidosis (in remission x30 years), and chronic back pain who presented to the [facility] ER complaining of exertional shortness of breath and getting an abnormal stress test with Dr. Urrutia. Pt was admitted and is now s/p C 10/4/23 that showed a heavily calcified LAD lesion with 75% mid stenosis with calcification noted. Pt denies any palpitations, orthopnea, PND, leg edema, n/v/d, fever, chills, hematochezia, hematemesis, melena, BRBPR and other symptoms.     In light of pt risk factors, abnormal prior cath and CCS class II symptoms, pt was referred to [facility] for high risk PCI/rota with Dr. Nunez.  Cardiologist: Dr. Urrutia  Pharmacy: [place] (in chart)  Escort:     72 yo F with PMHx of HTN, hypothyroidism, DM2, Afib (on Xarelto, last dose 10/10), hypothyroidism, CKD stage IV (baseline 2-2.2), sarcoidosis (in remission x30 years), and chronic back pain who presented to the [place] ER complaining of exertional shortness of breath and getting an abnormal stress test with Dr. Urrutia. Pt was admitted and is now s/p C 10/4/23 that showed a heavily calcified LAD lesion with 75% mid stenosis with calcification noted. Pt denies any palpitations, orthopnea, PND, leg edema, n/v/d, fever, chills, hematochezia, hematemesis, melena, BRBPR and other symptoms.     In light of pt risk factors, abnormal prior cath and CCS class II symptoms, pt was referred to [facility] for high risk PCI/rota with Dr. Nunez.

## 2023-10-10 NOTE — H&P ADULT - NSHPLABSRESULTS_GEN_ALL_CORE
EKG: NSR 62, 1st degree AV block                            10.4   6.60  )-----------( 146      ( 13 Oct 2023 06:51 )             33.8       10-13    145  |  110<H>  |  26<H>  ----------------------------<  88  5.1   |  25  |  2.32<H>    Ca    9.5      13 Oct 2023 07:07    TPro  7.2  /  Alb  4.4  /  TBili  0.2  /  DBili  x   /  AST  20  /  ALT  12  /  AlkPhos  98  10-13      PT/INR - ( 13 Oct 2023 06:51 )   PT: 10.4 sec;   INR: 0.91          PTT - ( 13 Oct 2023 06:51 )  PTT:31.7 sec    CARDIAC MARKERS ( 13 Oct 2023 07:07 )  x     / x     / 90 U/L / x     / 3.1 ng/mL        Urinalysis Basic - ( 13 Oct 2023 07:07 )    Color: x / Appearance: x / SG: x / pH: x  Gluc: 88 mg/dL / Ketone: x  / Bili: x / Urobili: x   Blood: x / Protein: x / Nitrite: x   Leuk Esterase: x / RBC: x / WBC x   Sq Epi: x / Non Sq Epi: x / Bacteria: x

## 2023-10-10 NOTE — H&P ADULT - CARDIOVASCULAR
normal/regular rate and rhythm/S1 S2 present/no gallops/no rub/no murmur/vascular normal/regular rate and rhythm/S1 S2 present/no gallops/no rub/no murmur/pedal edema/vascular

## 2023-10-13 ENCOUNTER — TRANSCRIPTION ENCOUNTER (OUTPATIENT)
Age: 73
End: 2023-10-13

## 2023-10-13 ENCOUNTER — INPATIENT (INPATIENT)
Facility: HOSPITAL | Age: 73
LOS: 0 days | Discharge: HOME | DRG: 324 | End: 2023-10-14
Attending: INTERNAL MEDICINE | Admitting: INTERNAL MEDICINE
Payer: COMMERCIAL

## 2023-10-13 DIAGNOSIS — Z98.890 OTHER SPECIFIED POSTPROCEDURAL STATES: Chronic | ICD-10-CM

## 2023-10-13 DIAGNOSIS — Z98.891 HISTORY OF UTERINE SCAR FROM PREVIOUS SURGERY: Chronic | ICD-10-CM

## 2023-10-13 LAB
A1C WITH ESTIMATED AVERAGE GLUCOSE RESULT: 7 % — HIGH (ref 4–5.6)
ALBUMIN SERPL ELPH-MCNC: 4.4 G/DL — SIGNIFICANT CHANGE UP (ref 3.3–5)
ALP SERPL-CCNC: 98 U/L — SIGNIFICANT CHANGE UP (ref 40–120)
ALT FLD-CCNC: 12 U/L — SIGNIFICANT CHANGE UP (ref 10–45)
ANION GAP SERPL CALC-SCNC: 10 MMOL/L — SIGNIFICANT CHANGE UP (ref 5–17)
APTT BLD: 31.7 SEC — SIGNIFICANT CHANGE UP (ref 24.5–35.6)
AST SERPL-CCNC: 20 U/L — SIGNIFICANT CHANGE UP (ref 10–40)
BASE EXCESS BLDV CALC-SCNC: -4.2 MMOL/L — LOW (ref -2–3)
BASOPHILS # BLD AUTO: 0.05 K/UL — SIGNIFICANT CHANGE UP (ref 0–0.2)
BASOPHILS NFR BLD AUTO: 0.8 % — SIGNIFICANT CHANGE UP (ref 0–2)
BILIRUB SERPL-MCNC: 0.2 MG/DL — SIGNIFICANT CHANGE UP (ref 0.2–1.2)
BUN SERPL-MCNC: 26 MG/DL — HIGH (ref 7–23)
CA-I SERPL-SCNC: 1.24 MMOL/L — SIGNIFICANT CHANGE UP (ref 1.15–1.33)
CALCIUM SERPL-MCNC: 9.5 MG/DL — SIGNIFICANT CHANGE UP (ref 8.4–10.5)
CHLORIDE SERPL-SCNC: 110 MMOL/L — HIGH (ref 96–108)
CHOLEST SERPL-MCNC: 151 MG/DL — SIGNIFICANT CHANGE UP
CK MB CFR SERPL CALC: 3.1 NG/ML — SIGNIFICANT CHANGE UP (ref 0–6.7)
CK SERPL-CCNC: 90 U/L — SIGNIFICANT CHANGE UP (ref 25–170)
CO2 BLDV-SCNC: 24.5 MMOL/L — SIGNIFICANT CHANGE UP (ref 22–26)
CO2 SERPL-SCNC: 25 MMOL/L — SIGNIFICANT CHANGE UP (ref 22–31)
COHGB MFR BLDV: 0.5 % — SIGNIFICANT CHANGE UP
CREAT SERPL-MCNC: 2.32 MG/DL — HIGH (ref 0.5–1.3)
EGFR: 22 ML/MIN/1.73M2 — LOW
EOSINOPHIL # BLD AUTO: 0.32 K/UL — SIGNIFICANT CHANGE UP (ref 0–0.5)
EOSINOPHIL NFR BLD AUTO: 4.8 % — SIGNIFICANT CHANGE UP (ref 0–6)
ESTIMATED AVERAGE GLUCOSE: 154 MG/DL — HIGH (ref 68–114)
GAS PNL BLDV: 136 MMOL/L — SIGNIFICANT CHANGE UP (ref 136–145)
GLUCOSE BLDC GLUCOMTR-MCNC: 140 MG/DL — HIGH (ref 70–99)
GLUCOSE BLDC GLUCOMTR-MCNC: 147 MG/DL — HIGH (ref 70–99)
GLUCOSE BLDC GLUCOMTR-MCNC: 78 MG/DL — SIGNIFICANT CHANGE UP (ref 70–99)
GLUCOSE BLDC GLUCOMTR-MCNC: 87 MG/DL — SIGNIFICANT CHANGE UP (ref 70–99)
GLUCOSE BLDV-MCNC: 88 MG/DL — SIGNIFICANT CHANGE UP (ref 70–99)
GLUCOSE SERPL-MCNC: 88 MG/DL — SIGNIFICANT CHANGE UP (ref 70–99)
HCO3 BLDV-SCNC: 23 MMOL/L — SIGNIFICANT CHANGE UP (ref 22–29)
HCT VFR BLD CALC: 33.8 % — LOW (ref 34.5–45)
HCT VFR BLDA CALC: 44 % — SIGNIFICANT CHANGE UP
HDLC SERPL-MCNC: 49 MG/DL — LOW
HGB BLD CALC-MCNC: 14.8 G/DL — SIGNIFICANT CHANGE UP (ref 11.7–16.1)
HGB BLD-MCNC: 10.4 G/DL — LOW (ref 11.5–15.5)
IMM GRANULOCYTES NFR BLD AUTO: 0.3 % — SIGNIFICANT CHANGE UP (ref 0–0.9)
INR BLD: 0.91 — SIGNIFICANT CHANGE UP (ref 0.85–1.18)
ISTAT INR: 1.1 — SIGNIFICANT CHANGE UP (ref 0.88–1.16)
ISTAT PT: 13.5 SEC — HIGH (ref 10–12.9)
LIPID PNL WITH DIRECT LDL SERPL: 75 MG/DL — SIGNIFICANT CHANGE UP
LYMPHOCYTES # BLD AUTO: 1.41 K/UL — SIGNIFICANT CHANGE UP (ref 1–3.3)
LYMPHOCYTES # BLD AUTO: 21.4 % — SIGNIFICANT CHANGE UP (ref 13–44)
MAGNESIUM SERPL-MCNC: 2.7 MG/DL — HIGH (ref 1.6–2.6)
MCHC RBC-ENTMCNC: 29.5 PG — SIGNIFICANT CHANGE UP (ref 27–34)
MCHC RBC-ENTMCNC: 30.8 GM/DL — LOW (ref 32–36)
MCV RBC AUTO: 95.8 FL — SIGNIFICANT CHANGE UP (ref 80–100)
METHGB MFR BLDV: 0.2 % — SIGNIFICANT CHANGE UP
MONOCYTES # BLD AUTO: 0.67 K/UL — SIGNIFICANT CHANGE UP (ref 0–0.9)
MONOCYTES NFR BLD AUTO: 10.2 % — SIGNIFICANT CHANGE UP (ref 2–14)
NEUTROPHILS # BLD AUTO: 4.13 K/UL — SIGNIFICANT CHANGE UP (ref 1.8–7.4)
NEUTROPHILS NFR BLD AUTO: 62.5 % — SIGNIFICANT CHANGE UP (ref 43–77)
NON HDL CHOLESTEROL: 102 MG/DL — SIGNIFICANT CHANGE UP
NRBC # BLD: 0 /100 WBCS — SIGNIFICANT CHANGE UP (ref 0–0)
PCO2 BLDV: 49 MMHG — SIGNIFICANT CHANGE UP (ref 39–52)
PH BLDV: 7.28 — LOW (ref 7.32–7.43)
PLATELET # BLD AUTO: 146 K/UL — LOW (ref 150–400)
PO2 BLDV: <33 MMHG — SIGNIFICANT CHANGE UP (ref 25–45)
POTASSIUM BLDV-SCNC: 5.1 MMOL/L — SIGNIFICANT CHANGE UP (ref 3.5–5.1)
POTASSIUM SERPL-MCNC: 5.1 MMOL/L — SIGNIFICANT CHANGE UP (ref 3.5–5.3)
POTASSIUM SERPL-SCNC: 5.1 MMOL/L — SIGNIFICANT CHANGE UP (ref 3.5–5.3)
PROT SERPL-MCNC: 7.2 G/DL — SIGNIFICANT CHANGE UP (ref 6–8.3)
PROTHROM AB SERPL-ACNC: 10.4 SEC — SIGNIFICANT CHANGE UP (ref 9.5–13)
RBC # BLD: 3.53 M/UL — LOW (ref 3.8–5.2)
RBC # FLD: 13.4 % — SIGNIFICANT CHANGE UP (ref 10.3–14.5)
SAO2 % BLDV: 50 % — LOW (ref 67–88)
SODIUM SERPL-SCNC: 145 MMOL/L — SIGNIFICANT CHANGE UP (ref 135–145)
TRIGL SERPL-MCNC: 136 MG/DL — SIGNIFICANT CHANGE UP
WBC # BLD: 6.6 K/UL — SIGNIFICANT CHANGE UP (ref 3.8–10.5)
WBC # FLD AUTO: 6.6 K/UL — SIGNIFICANT CHANGE UP (ref 3.8–10.5)

## 2023-10-13 PROCEDURE — 92933 PRQ TRLML C ATHRC ST ANGIOP1: CPT | Mod: LD

## 2023-10-13 PROCEDURE — 99152 MOD SED SAME PHYS/QHP 5/>YRS: CPT

## 2023-10-13 PROCEDURE — 93458 L HRT ARTERY/VENTRICLE ANGIO: CPT | Mod: 26,59

## 2023-10-13 PROCEDURE — 0715T: CPT

## 2023-10-13 PROCEDURE — 92978 ENDOLUMINL IVUS OCT C 1ST: CPT | Mod: 26,LD

## 2023-10-13 PROCEDURE — 93010 ELECTROCARDIOGRAM REPORT: CPT

## 2023-10-13 RX ORDER — SODIUM CHLORIDE 9 MG/ML
1000 INJECTION, SOLUTION INTRAVENOUS
Refills: 0 | Status: DISCONTINUED | OUTPATIENT
Start: 2023-10-13 | End: 2023-10-14

## 2023-10-13 RX ORDER — SODIUM CHLORIDE 9 MG/ML
500 INJECTION INTRAMUSCULAR; INTRAVENOUS; SUBCUTANEOUS
Refills: 0 | Status: DISCONTINUED | OUTPATIENT
Start: 2023-10-13 | End: 2023-10-14

## 2023-10-13 RX ORDER — CLOPIDOGREL BISULFATE 75 MG/1
600 TABLET, FILM COATED ORAL ONCE
Refills: 0 | Status: COMPLETED | OUTPATIENT
Start: 2023-10-13 | End: 2023-10-13

## 2023-10-13 RX ORDER — LEVOTHYROXINE SODIUM 125 MCG
50 TABLET ORAL DAILY
Refills: 0 | Status: DISCONTINUED | OUTPATIENT
Start: 2023-10-13 | End: 2023-10-14

## 2023-10-13 RX ORDER — LISINOPRIL 2.5 MG/1
40 TABLET ORAL DAILY
Refills: 0 | Status: DISCONTINUED | OUTPATIENT
Start: 2023-10-14 | End: 2023-10-14

## 2023-10-13 RX ORDER — AMLODIPINE BESYLATE 2.5 MG/1
5 TABLET ORAL DAILY
Refills: 0 | Status: DISCONTINUED | OUTPATIENT
Start: 2023-10-13 | End: 2023-10-14

## 2023-10-13 RX ORDER — SODIUM CHLORIDE 9 MG/ML
250 INJECTION INTRAMUSCULAR; INTRAVENOUS; SUBCUTANEOUS ONCE
Refills: 0 | Status: COMPLETED | OUTPATIENT
Start: 2023-10-13 | End: 2023-10-13

## 2023-10-13 RX ORDER — INSULIN GLARGINE 100 [IU]/ML
15 INJECTION, SOLUTION SUBCUTANEOUS AT BEDTIME
Refills: 0 | Status: DISCONTINUED | OUTPATIENT
Start: 2023-10-13 | End: 2023-10-14

## 2023-10-13 RX ORDER — DEXTROSE 50 % IN WATER 50 %
25 SYRINGE (ML) INTRAVENOUS ONCE
Refills: 0 | Status: DISCONTINUED | OUTPATIENT
Start: 2023-10-13 | End: 2023-10-14

## 2023-10-13 RX ORDER — OXYCODONE HYDROCHLORIDE 5 MG/1
10 TABLET ORAL DAILY
Refills: 0 | Status: DISCONTINUED | OUTPATIENT
Start: 2023-10-13 | End: 2023-10-14

## 2023-10-13 RX ORDER — INSULIN LISPRO 100/ML
VIAL (ML) SUBCUTANEOUS
Refills: 0 | Status: DISCONTINUED | OUTPATIENT
Start: 2023-10-13 | End: 2023-10-14

## 2023-10-13 RX ORDER — DEXTROSE 50 % IN WATER 50 %
12.5 SYRINGE (ML) INTRAVENOUS ONCE
Refills: 0 | Status: DISCONTINUED | OUTPATIENT
Start: 2023-10-13 | End: 2023-10-14

## 2023-10-13 RX ORDER — GLUCAGON INJECTION, SOLUTION 0.5 MG/.1ML
1 INJECTION, SOLUTION SUBCUTANEOUS ONCE
Refills: 0 | Status: DISCONTINUED | OUTPATIENT
Start: 2023-10-13 | End: 2023-10-14

## 2023-10-13 RX ORDER — ATORVASTATIN CALCIUM 80 MG/1
40 TABLET, FILM COATED ORAL AT BEDTIME
Refills: 0 | Status: DISCONTINUED | OUTPATIENT
Start: 2023-10-13 | End: 2023-10-14

## 2023-10-13 RX ORDER — ASPIRIN/CALCIUM CARB/MAGNESIUM 324 MG
325 TABLET ORAL ONCE
Refills: 0 | Status: COMPLETED | OUTPATIENT
Start: 2023-10-13 | End: 2023-10-13

## 2023-10-13 RX ORDER — DEXTROSE 50 % IN WATER 50 %
15 SYRINGE (ML) INTRAVENOUS ONCE
Refills: 0 | Status: DISCONTINUED | OUTPATIENT
Start: 2023-10-13 | End: 2023-10-14

## 2023-10-13 RX ORDER — METOPROLOL TARTRATE 50 MG
50 TABLET ORAL
Refills: 0 | Status: DISCONTINUED | OUTPATIENT
Start: 2023-10-13 | End: 2023-10-14

## 2023-10-13 RX ORDER — CLOPIDOGREL BISULFATE 75 MG/1
75 TABLET, FILM COATED ORAL DAILY
Refills: 0 | Status: DISCONTINUED | OUTPATIENT
Start: 2023-10-14 | End: 2023-10-14

## 2023-10-13 RX ORDER — SODIUM CHLORIDE 9 MG/ML
500 INJECTION INTRAMUSCULAR; INTRAVENOUS; SUBCUTANEOUS
Refills: 0 | Status: DISCONTINUED | OUTPATIENT
Start: 2023-10-13 | End: 2023-10-13

## 2023-10-13 RX ORDER — RIVAROXABAN 15 MG-20MG
15 KIT ORAL DAILY
Refills: 0 | Status: DISCONTINUED | OUTPATIENT
Start: 2023-10-14 | End: 2023-10-14

## 2023-10-13 RX ADMIN — AMLODIPINE BESYLATE 5 MILLIGRAM(S): 2.5 TABLET ORAL at 17:55

## 2023-10-13 RX ADMIN — SODIUM CHLORIDE 180 MILLILITER(S): 9 INJECTION INTRAMUSCULAR; INTRAVENOUS; SUBCUTANEOUS at 11:46

## 2023-10-13 RX ADMIN — SODIUM CHLORIDE 500 MILLILITER(S): 9 INJECTION INTRAMUSCULAR; INTRAVENOUS; SUBCUTANEOUS at 11:47

## 2023-10-13 RX ADMIN — ATORVASTATIN CALCIUM 40 MILLIGRAM(S): 80 TABLET, FILM COATED ORAL at 23:01

## 2023-10-13 RX ADMIN — Medication 325 MILLIGRAM(S): at 07:51

## 2023-10-13 RX ADMIN — SODIUM CHLORIDE 500 MILLILITER(S): 9 INJECTION INTRAMUSCULAR; INTRAVENOUS; SUBCUTANEOUS at 07:51

## 2023-10-13 RX ADMIN — INSULIN GLARGINE 15 UNIT(S): 100 INJECTION, SOLUTION SUBCUTANEOUS at 22:55

## 2023-10-13 RX ADMIN — CLOPIDOGREL BISULFATE 600 MILLIGRAM(S): 75 TABLET, FILM COATED ORAL at 07:51

## 2023-10-13 RX ADMIN — Medication 50 MILLIGRAM(S): at 16:08

## 2023-10-13 RX ADMIN — SODIUM CHLORIDE 150 MILLILITER(S): 9 INJECTION INTRAMUSCULAR; INTRAVENOUS; SUBCUTANEOUS at 08:45

## 2023-10-13 RX ADMIN — OXYCODONE HYDROCHLORIDE 10 MILLIGRAM(S): 5 TABLET ORAL at 23:03

## 2023-10-13 NOTE — DISCHARGE NOTE PROVIDER - NSDCCPCAREPLAN_GEN_ALL_CORE_FT
PRINCIPAL DISCHARGE DIAGNOSIS  Diagnosis: CAD (coronary artery disease)  Assessment and Plan of Treatment: You were found to have blockages in the arteries of your heart, also known as Coronary Artery Disease. You underwent a cardiac catheterization on 10/13/23 and received two stents to the left anterior descending artery.  PLEASE CONTINUE ASPIRIN 81MG DAILY AND PLAVIX 75MG DAILY. DO NOT STOP THESE MEDICATIONS FOR ANY REASON AS THEY ARE KEEPING YOUR STENT OPEN AND PREVENTING A HEART ATTACK.   Avoid strenuous activity or heavy lifting anything more than 5lbs for the next five days. Do not take a bath or swim for the next five days; you may shower. For any bleeding or hematoma formation (hardened blood collection under the skin) at the access site of your ____ please hold pressure and go to the emergency room. Please follow up with  ___ in 1-2 weeks. For recurrent chest pain, please call your doctor or go to the emergency room.        SECONDARY DISCHARGE DIAGNOSES  Diagnosis: HTN (hypertension)  Assessment and Plan of Treatment: Please continue Ramipril 10mg and Metoprolol 50mg BID as listed to keep your blood pressure controlled. For blood pressure that is too high or too low please see your doctor or go to the emergency room as necessary.      Diagnosis: Hypothyroidism  Assessment and Plan of Treatment: Please continue your levothyroxine as prescribed.    Diagnosis: DM (diabetes mellitus)  Assessment and Plan of Treatment: Your Hemoglobin A1c is ___ and your goal A1c is less than 7.0%. This number measures your average blood sugar level over the last three months.  Please continue ___ as listed for diabetes. Maintain a low carbohydrate, low sugar diet, exercise, monitor your fingerstick blood sugars regarly and follow up with your Endocrinologist/Primary Care Doctor.  If you are a diabetic and you take Metformin: DO NOT TAKE your Metformin for two days. This medication can interact with the contrast used during your procedure therefore we want to ensure the contrast has left your body prior to you restarting your Metformin.      Diagnosis: Afib  Assessment and Plan of Treatment:      PRINCIPAL DISCHARGE DIAGNOSIS  Diagnosis: CAD (coronary artery disease)  Assessment and Plan of Treatment: You were found to have blockages in the arteries of your heart, also known as Coronary Artery Disease. You underwent a cardiac catheterization on 10/13/23 and received two stents to the left anterior descending artery.  PLEASE CONTINUE XARELTO 15 MG DAILY AND PLAVIX 75MG DAILY. DO NOT STOP THESE MEDICATIONS FOR ANY REASON AS THEY ARE KEEPING YOUR STENT OPEN AND PREVENTING A HEART ATTACK.   Avoid strenuous activity or heavy lifting anything more than 5lbs for the next five days. Do not take a bath or swim for the next five days; you may shower. For any bleeding or hematoma formation (hardened blood collection under the skin) at the access site of your right wrist please hold pressure and go to the emergency room. Please follow up with Dr. Dr Urrutia in 1-2 weeks. For recurrent chest pain, please call your doctor or go to the emergency room.        SECONDARY DISCHARGE DIAGNOSES  Diagnosis: HTN (hypertension)  Assessment and Plan of Treatment: Please continue Ramipril 10mg daily, Metoprolol 50mg twice a day, and new medication amlodipine 5 mg daily as listed to keep your blood pressure controlled. For blood pressure that is too high or too low please see your doctor or go to the emergency room as necessary.      Diagnosis: Hypothyroidism  Assessment and Plan of Treatment: Please continue your levothyroxine as prescribed.    Diagnosis: DM (diabetes mellitus)  Assessment and Plan of Treatment: Your Hemoglobin A1c is 7.0% which is at goal. This number measures your average blood sugar level over the last three months.  Please continue insluine glargine as before as well as mounjaro and jardiance for diabetes. Maintain a low carbohydrate, low sugar diet, exercise, monitor your fingerstick blood sugars regarly and follow up with your Endocrinologist/Primary Care Doctor.  If you are a diabetic and you take Metformin: DO NOT TAKE your Metformin for two days. This medication can interact with the contrast used during your procedure therefore we want to ensure the contrast has left your body prior to you restarting your Metformin.      Diagnosis: Afib  Assessment and Plan of Treatment: You have an abnormal heart rhythm (arrhythmia) called atrial fibrillation. With this condition, the hearts 2 upper chambers (the atria) quiver rather than squeeze the blood out in a normal pattern. This leads to an irregular and sometimes rapid heartbeat. Atrial fibrillation is serious condition as it affects the heart’s ability to fill with blood as it should and blood clots may form, which increases the risk for stroke.  -Please CONTINUE  XARELTO 15 MG ONCE A DAY to prevent a stroke.  -Please CONTINUE METOPROLOL TARTRATE 50 MG TWICE A DAY to keep your heart rate regular  -Please follow up with Dr. Urrutia within 2 weeks.

## 2023-10-13 NOTE — PATIENT PROFILE ADULT - FALL HARM RISK - HARM RISK INTERVENTIONS
Communicate Risk of Fall with Harm to all staff/Reinforce activity limits and safety measures with patient and family/Tailored Fall Risk Interventions/Visual Cue: Yellow wristband and red socks/Bed in lowest position, wheels locked, appropriate side rails in place/Call bell, personal items and telephone in reach/Instruct patient to call for assistance before getting out of bed or chair/Non-slip footwear when patient is out of bed/[place] to call system/Physically safe environment - no spills, clutter or unnecessary equipment/Purposeful Proactive Rounding/Room/bathroom lighting operational, light cord in reach Communicate Risk of Fall with Harm to all staff/Reinforce activity limits and safety measures with patient and family/Tailored Fall Risk Interventions/Visual Cue: Yellow wristband and red socks/Bed in lowest position, wheels locked, appropriate side rails in place/Call bell, personal items and telephone in reach/Instruct patient to call for assistance before getting out of bed or chair/Non-slip footwear when patient is out of bed/[place] to call system/Physically safe environment - no spills, clutter or unnecessary equipment/Purposeful Proactive Rounding/Room/bathroom lighting operational, light cord in reach Communicate Risk of Fall with Harm to all staff/Reinforce activity limits and safety measures with patient and family/Tailored Fall Risk Interventions/Visual Cue: Yellow wristband and red socks/Bed in lowest position, wheels locked, appropriate side rails in place/Call bell, personal items and telephone in reach/Instruct patient to call for assistance before getting out of bed or chair/Non-slip footwear when patient is out of bed/[place] to call system/Physically safe environment - no spills, clutter or unnecessary equipment/Purposeful Proactive Rounding/Room/bathroom lighting operational, light cord in reach

## 2023-10-13 NOTE — DISCHARGE NOTE PROVIDER - NSDCMRMEDTOKEN_GEN_ALL_CORE_FT
atorvastatin 40 mg oral tablet: 1 tab(s) orally once a day (at bedtime)  cholecalciferol 125 mcg (5000 intl units) oral capsule: 1 cap(s) orally once a day  insulin glargine (concentrated) 300 units/mL subcutaneous solution: 28 unit(s) subcutaneous once a day  Jardiance 10 mg oral tablet: 1 tab(s) orally once a day  Lasix 20 mg oral tablet: 1 tab(s) orally once a day  levothyroxine 50 mcg (0.05 mg) oral capsule: 1 cap(s) orally once a day  Metoprolol Tartrate 50 mg oral tablet: 1 tab(s) orally 2 times a day  Mounjaro 2.5 mg/0.5 mL subcutaneous solution: 2.5 milligram(s) subcutaneously once a week  oxyCODONE 10 mg oral tablet: 1 tab(s) orally once a day as needed for  moderate pain  ramipril 10 mg oral capsule: 1 cap(s) orally once a day  Xarelto 15 mg oral tablet: 1 tab(s) orally once a day   amLODIPine 5 mg oral tablet: 1 tab(s) orally once a day  atorvastatin 40 mg oral tablet: 1 tab(s) orally once a day (at bedtime)  Cardiac Rehabilitation: Cardiac rehab 2-3 times week, Diagnosis - post PCI, Cardiologist - Dr Urrutia MDD: 1  cholecalciferol 125 mcg (5000 intl units) oral capsule: 1 cap(s) orally once a day  clopidogrel 75 mg oral tablet: 1 tab(s) orally once a day  insulin glargine (concentrated) 300 units/mL subcutaneous solution: 28 unit(s) subcutaneous once a day  Jardiance 10 mg oral tablet: 1 tab(s) orally once a day  Lasix 20 mg oral tablet: 1 tab(s) orally once a day  levothyroxine 50 mcg (0.05 mg) oral capsule: 1 cap(s) orally once a day  Metoprolol Tartrate 50 mg oral tablet: 1 tab(s) orally 2 times a day  Mounjaro 2.5 mg/0.5 mL subcutaneous solution: 2.5 milligram(s) subcutaneously once a week  oxyCODONE 10 mg oral tablet: 1 tab(s) orally once a day as needed for  moderate pain  ramipril 10 mg oral capsule: 1 cap(s) orally once a day  rivaroxaban 15 mg oral tablet: 1 tab(s) orally once a day

## 2023-10-13 NOTE — PATIENT PROFILE ADULT - LIVING ENVIRONMENT
[Awake] : awake [Alert] : alert [Acute Distress] : no acute distress [Mass] : no breast mass [Nipple Discharge] : no nipple discharge [Axillary LAD] : no axillary lymphadenopathy [Soft] : soft [Tender] : non tender [Oriented x3] : oriented to person, place, and time [Normal] : uterus [No Bleeding] : there was no active vaginal bleeding [Uterine Adnexae] : were not tender and not enlarged no

## 2023-10-13 NOTE — DISCHARGE NOTE PROVIDER - NSDCACTIVITY_GEN_ALL_CORE
Onfi prescription sent to [facility]. Per pharmacist, this medication does not need prior authorization and will be covered with no copayment. 30 day supply written and ordered for medications to bedside.    Erlin Brennan PA-C Showering allowed/Stairs allowed/No heavy lifting/straining Showering allowed/Stairs allowed/Walking - Indoors allowed/No heavy lifting/straining/Walking - Outdoors allowed

## 2023-10-13 NOTE — DISCHARGE NOTE PROVIDER - CARE PROVIDER_API CALL
Benny Urrutia  Cardiology  [address], [address]  [place] [address]  Phone: [phone]  Fax: [phone]  Follow Up Time: 2 weeks   Benny Urrutia  Cardiology  [address], [address]  [place] [address]  Phone: [phone]  Fax: [phone]  Follow Up Time: 2 weeks   Benny Urrutia  Cardiology  [address] [facility], [address]  [place] [address]  Phone: [phone]  Fax: [phone]  Follow Up Time: 2 weeks

## 2023-10-13 NOTE — DISCHARGE NOTE PROVIDER - PROVIDER TOKENS
PROVIDER:[TOKEN:[[id number]],FOLLOWUP:[2 weeks]] PROVIDER:[TOKEN:[[id number]],FOLLOWUP:[2 weeks]] PROVIDER:[TOKEN:[[id number]],FOLLOWUP:[2 weeks]]

## 2023-10-13 NOTE — DISCHARGE NOTE PROVIDER - HOSPITAL COURSE
INCOMPLETE    72 yo F with PMHx of HTN, hypothyroidism, DM2, Afib (on Xarelto, last dose 10/10), hypothyroidism, CKD stage IV (baseline Cr 2-2.2), sarcoidosis (in remission x30 years), and chronic back pain who presented to [facility] cardiac cath lab for high risk PCI/rota in light of known LAD disease and CCS class II symptoms.       Patient is now s/p cardiac cath 10/13/23: Rotational atherectomy/shockwave/ballooning and ROXIE x 1 pLAD (70-80%), ROXIE x 1 mLAD (70-80%), LM: MLI. Did not examined Right side this admission, No EDP. Access via right radial artery. Pt with hx of CKD (baseline Cr 2-2.2) with Cr 2.32 on admission. Pt received additional 250cc bolus followed by 180cc x 6hrs post procedure per fellow.     Pt seen and examined at bedside this AM without any complaints or events overnight, VSS, labs and telemetry reviewed and pt stable for discharge as discussed with Dr. Ortiz. Pt has received appropriate discharge instructions, including medication regimen, access site management and follow up with Dr. Urrutia in 1-2 weeks.    Discharge medications: ASA 81mg QD, Plavix 75mg QD, ______________.     INCOMPLETE    74 yo F with PMHx of HTN, hypothyroidism, DM2, Afib (on Xarelto, last dose 10/10), hypothyroidism, CKD stage IV (baseline Cr 2-2.2), sarcoidosis (in remission x30 years), and chronic back pain who presented to [facility] cardiac cath lab for high risk PCI/rota in light of known LAD disease and CCS class II symptoms.       Patient is now s/p cardiac cath 10/13/23: Rotational atherectomy/shockwave/ballooning and ROXIE x 1 pLAD (70-80%), ROXIE x 1 mLAD (70-80%), LM: MLI. Did not examined Right side this admission, No EDP. Access via right radial artery. Pt with hx of CKD (baseline Cr 2-2.2) with Cr 2.32 on admission. Pt received additional 250cc bolus followed by 180cc x 6hrs post procedure per fellow.     Pt seen and examined at bedside this AM without any complaints or events overnight, VSS, labs and telemetry reviewed and pt stable for discharge as discussed with Dr. Ortiz. Pt has received appropriate discharge instructions, including medication regimen, access site management and follow up with Dr. Urrutia in 1-2 weeks.    Discharge medications: ASA 81mg QD, Plavix 75mg QD, ______________.     INCOMPLETE    74 yo F with PMHx of HTN, hypothyroidism, DM2, Afib (on Xarelto, last dose 10/10), hypothyroidism, CKD stage IV (baseline Cr 2-2.2), sarcoidosis (in remission x30 years), and chronic back pain who presented to [facility] cardiac cath lab for high risk PCI/rota in light of known LAD disease and CCS class II symptoms.       Patient is now s/p cardiac cath 10/13/23: Rotational atherectomy/shockwave/ballooning and ROXIE x 1 pLAD (70-80%), ROXIE x 1 mLAD (70-80%), LM: MLI. Did not examined Right side this admission, No EDP. Access via right radial artery. Pt with hx of CKD (baseline Cr 2-2.2) with Cr 2.32 on admission. Pt received additional 250cc bolus followed by 180cc x 6hrs post procedure per fellow.     Pt seen and examined at bedside this AM without any complaints or events overnight, VSS, labs and telemetry reviewed and pt stable for discharge as discussed with Dr. Ortiz. Pt has received appropriate discharge instructions, including medication regimen, access site management and follow up with Dr. Urrutia in 1-2 weeks.    Discharge medications: ASA 81mg QD, Plavix 75mg QD, ______________.     INCOMPLETE    72 yo F with PMHx of HTN, hypothyroidism, DM2, Afib (on Xarelto, last dose 10/10), hypothyroidism, CKD stage IV (baseline Cr 2-2.2), sarcoidosis (in remission x30 years), and chronic back pain who presented to [facility] cardiac cath lab for high risk PCI/rota in light of known LAD disease and CCS class II symptoms.     Patient is now s/p cardiac cath 10/13/23: Rotational atherectomy/shockwave/ballooning and ROXIE x 1 pLAD (70-80%), ROXIE x 1 mLAD (70-80%), LM: MLI. Did not examined Right side this admission, No EDP. Access via right radial artery. Pt with hx of CKD (baseline Cr 2-2.2) with Cr 2.32 on admission. Pt received additional 250cc bolus followed by 180cc x 6hrs post procedure per fellow.     Pt seen and examined at bedside this AM without any complaints or events overnight, VSS, labs and telemetry reviewed and pt stable for discharge as discussed with Dr. Ortiz. Pt has received appropriate discharge instructions, including medication regimen, access site management and follow up with Dr. Urrutia in 1-2 weeks.    Discharge medications: ASA 81mg QD, Plavix 75mg QD, ______________.     INCOMPLETE    74 yo F with PMHx of HTN, hypothyroidism, DM2, Afib (on Xarelto, last dose 10/10), hypothyroidism, CKD stage IV (baseline Cr 2-2.2), sarcoidosis (in remission x30 years), and chronic back pain who presented to [facility] cardiac cath lab for high risk PCI/rota in light of known LAD disease and CCS class II symptoms.     Patient is now s/p cardiac cath 10/13/23: Rotational atherectomy/shockwave/ballooning and ROXIE x 1 pLAD (70-80%), ROXIE x 1 mLAD (70-80%), LM: MLI. Did not examined Right side this admission, No EDP. Access via right radial artery. Pt with hx of CKD (baseline Cr 2-2.2) with Cr 2.32 on admission. Pt received additional 250cc bolus followed by 180cc x 6hrs post procedure per fellow.     Pt seen and examined at bedside this AM without any complaints or events overnight, VSS, labs and telemetry reviewed and pt stable for discharge as discussed with Dr. Ortiz. Pt has received appropriate discharge instructions, including medication regimen, access site management and follow up with Dr. Urrutia in 1-2 weeks.    Discharge medications: ASA 81mg QD, Plavix 75mg QD, ______________.     INCOMPLETE    74 yo F with PMHx of HTN, hypothyroidism, DM2, Afib (on Xarelto, last dose 10/10), hypothyroidism, CKD stage IV (baseline Cr 2-2.2), sarcoidosis (in remission x30 years), and chronic back pain who presented to [facility] cardiac cath lab for high risk PCI/rota in light of known LAD disease and CCS class II symptoms.     Patient is now s/p cardiac cath 10/13/23: Rotational atherectomy/shockwave/ballooning and ROXIE x 1 pLAD (70-80%), ROXIE x 1 mLAD (70-80%), LM: MLI. Did not examined Right side this admission, No EDP. Access via right radial artery. Pt with hx of CKD (baseline Cr 2-2.2) with Cr 2.32 on admission. Pt received additional 250cc bolus followed by 180cc x 6hrs post procedure per fellow.     Pt seen and examined at bedside this AM without any complaints or events overnight, VSS, labs and telemetry reviewed and pt stable for discharge as discussed with Dr. Ortiz. Pt has received appropriate discharge instructions, including medication regimen, access site management and follow up with Dr. Urrutia in 1-2 weeks.    Discharge medications: ASA 81mg QD, Plavix 75mg QD, ______________.     72 yo F with PMHx of HTN, hypothyroidism, DM2, Afib (on Xarelto, last dose 10/10), hypothyroidism, CKD stage IV (baseline Cr 2-2.2), sarcoidosis (in remission x30 years), and chronic back pain who presented to [facility] cardiac cath lab for high risk PCI/rota in light of known LAD disease and CCS class II symptoms.     Patient is now s/p cardiac cath 10/13/23: Rotational atherectomy/shockwave/ballooning and ROXIE x 1 pLAD (70-80%), ROXIE x 1 mLAD (70-80%), LM: MLI. Did not examined Right side this admission, No EDP. Access via right radial artery. Pt with hx of CKD (baseline Cr 2-2.2) with Cr 2.32 on admission. Pt received additional 250cc bolus followed by 180cc x 6hrs post procedure per fellow.     Pt seen and examined at bedside this AM without any complaints or events overnight, VSS, labs and telemetry reviewed and pt stable for discharge as discussed with Dr. Ortiz. Pt has received appropriate discharge instructions, including medication regimen, access site management and follow up with Dr. Urrutia in 1-2 weeks.    Discharge medications: xarelto 15 mg QD, Plavix 75mg QD, lopressor 50 mg BID, lasix 20 mg QD, ramipril 10 mg QD, lipitor 40 mg QD, Jardiance 10 mg QD, mounjaro, synthroid 50 mcg,     · 	Lasix 20 mg oral tablet: Last Dose Taken: 12-Oct-2023 , 1 tab(s) orally once a day  · 	Metoprolol Tartrate 50 mg oral tablet: Last Dose Taken: 13-Oct-2023 AM, 1 tab(s) orally 2 times a day  · 	levothyroxine 50 mcg (0.05 mg) oral capsule: Last Dose Taken: 12-Oct-2023 AM, 1 cap(s) orally once a day  · 	oxyCODONE 10 mg oral tablet: Last Dose Taken: 12-Oct-2023 PM, 1 tab(s) orally once a day as needed for  moderate pain  · 	ramipril 10 mg oral capsule: Last Dose Taken: 13-Oct-2023 AM, 1 cap(s) orally once a day  · 	cholecalciferol 125 mcg (5000 intl units) oral capsule: Last Dose Taken: 12-Oct-2023 PM, 1 cap(s) orally once a day  · 	atorvastatin 40 mg oral tablet: Last Dose Taken: 12-Oct-2023 PM, 1 tab(s) orally once a day (at bedtime)  · 	insulin glargine (concentrated) 300 units/mL subcutaneous solution: Last Dose Taken: 12-Oct-2023 AM, 28 unit(s) subcutaneous once a day  · 	Xarelto 15 mg oral tablet: Last Dose Taken: 10-Oct-2023 , 1 tab(s) orally once a day  · 	Jardiance 10 mg oral tablet: Last Dose Taken: 12-Oct-2023 AM, 1 tab(s) orally once a day  · 	Mounjaro 2.5 mg/0.5 mL subcutaneous solution: Last Dose Taken: 07-Oct-2023 , 2.5 milligram(s) subcutaneously once a week   72 yo F with PMHx of HTN, hypothyroidism, DM2, Afib (on Xarelto, last dose 10/10), hypothyroidism, CKD stage IV (baseline Cr 2-2.2), sarcoidosis (in remission x30 years), and chronic back pain who presented to [facility] cardiac cath lab for high risk PCI/rota in light of known LAD disease and CCS class II symptoms.     Patient is now s/p cardiac cath 10/13/23: Rotational atherectomy/shockwave/ballooning and ROXIE x 1 pLAD (70-80%), ROXIE x 1 mLAD (70-80%), LM: MLI. Did not examined Right side this admission, No EDP. Access via right radial artery. Pt with hx of CKD (baseline Cr 2-2.2) with Cr 2.32 on admission. Pt received additional 250cc bolus followed by 180cc x 6hrs post procedure per fellow.     Pt seen and examined at bedside this AM without any complaints or events overnight, VSS, labs and telemetry reviewed and pt stable for discharge as discussed with Dr. Ortiz. Pt has received appropriate discharge instructions, including medication regimen, access site management and follow up with Dr. Urrutia in 1-2 weeks.    Discharge medications: xarelto 15 mg QD, Plavix 75mg QD, lopressor 50 mg BID, lasix 20 mg QD, ramipril 10 mg QD, lipitor 40 mg QD, Jardiance 10 mg QD, mounjaro, synthroid 50 mcg,     · 	Lasix 20 mg oral tablet: Last Dose Taken: 12-Oct-2023 , 1 tab(s) orally once a day  · 	Metoprolol Tartrate 50 mg oral tablet: Last Dose Taken: 13-Oct-2023 AM, 1 tab(s) orally 2 times a day  · 	levothyroxine 50 mcg (0.05 mg) oral capsule: Last Dose Taken: 12-Oct-2023 AM, 1 cap(s) orally once a day  · 	oxyCODONE 10 mg oral tablet: Last Dose Taken: 12-Oct-2023 PM, 1 tab(s) orally once a day as needed for  moderate pain  · 	ramipril 10 mg oral capsule: Last Dose Taken: 13-Oct-2023 AM, 1 cap(s) orally once a day  · 	cholecalciferol 125 mcg (5000 intl units) oral capsule: Last Dose Taken: 12-Oct-2023 PM, 1 cap(s) orally once a day  · 	atorvastatin 40 mg oral tablet: Last Dose Taken: 12-Oct-2023 PM, 1 tab(s) orally once a day (at bedtime)  · 	insulin glargine (concentrated) 300 units/mL subcutaneous solution: Last Dose Taken: 12-Oct-2023 AM, 28 unit(s) subcutaneous once a day  · 	Xarelto 15 mg oral tablet: Last Dose Taken: 10-Oct-2023 , 1 tab(s) orally once a day  · 	Jardiance 10 mg oral tablet: Last Dose Taken: 12-Oct-2023 AM, 1 tab(s) orally once a day  · 	Mounjaro 2.5 mg/0.5 mL subcutaneous solution: Last Dose Taken: 07-Oct-2023 , 2.5 milligram(s) subcutaneously once a week   74 yo F with PMHx of HTN, hypothyroidism, DM2, Afib (on Xarelto, last dose 10/10), hypothyroidism, CKD stage IV (baseline Cr 2-2.2), sarcoidosis (in remission x30 years), and chronic back pain who presented to [facility] cardiac cath lab for high risk PCI/rota in light of known LAD disease and CCS class II symptoms.     Patient is now s/p cardiac cath 10/13/23: Rotational atherectomy/shockwave/ballooning and ROXIE x 1 pLAD (70-80%), ROXIE x 1 mLAD (70-80%), LM: MLI. Did not examined Right side this admission, No EDP. Access via right radial artery. Pt with hx of CKD (baseline Cr 2-2.2) with Cr 2.32 on admission. Pt received additional 250cc bolus followed by 180cc x 6hrs post procedure per fellow.     Pt seen and examined at bedside this AM without any complaints or events overnight, VSS, labs and telemetry reviewed and pt stable for discharge as discussed with Dr. Ortiz. Pt has received appropriate discharge instructions, including medication regimen, access site management and follow up with Dr. Urrutia in 1-2 weeks.    Discharge medications: xarelto 15 mg QD, Plavix 75mg QD, lopressor 50 mg BID, lasix 20 mg QD, ramipril 10 mg QD, lipitor 40 mg QD, Jardiance 10 mg QD, mounjaro, synthroid 50 mcg, glargine 28U QD    Cardiac Rehab (Post PCI): Education on benefits of Cardiac Rehab provided to patient: Yes, Referral and Prescription Given for Cardiac Rehab: Yes, Pt given list of locations & instructed to contact their insurance company to review list of participating providers.   72 yo F with PMHx of HTN, hypothyroidism, DM2, Afib (on Xarelto, last dose 10/10), hypothyroidism, CKD stage IV (baseline Cr 2-2.2), sarcoidosis (in remission x30 years), and chronic back pain who presented to [facility] cardiac cath lab for high risk PCI/rota in light of known LAD disease and CCS class II symptoms.     Patient is now s/p cardiac cath 10/13/23: Rotational atherectomy/shockwave/ballooning and ROXIE x 1 pLAD (70-80%), ROXIE x 1 mLAD (70-80%), LM: MLI. Did not examined Right side this admission, No EDP. Access via right radial artery. Pt with hx of CKD (baseline Cr 2-2.2) with Cr 2.32 on admission. Pt received additional 250cc bolus followed by 180cc x 6hrs post procedure per fellow.     Pt seen and examined at bedside this AM without any complaints or events overnight, VSS, labs and telemetry reviewed and pt stable for discharge as discussed with Dr. Ortiz. Pt has received appropriate discharge instructions, including medication regimen, access site management and follow up with Dr. Urrutia in 1-2 weeks.    Discharge medications: xarelto 15 mg QD, Plavix 75mg QD, lopressor 50 mg BID, lasix 20 mg QD, ramipril 10 mg QD, lipitor 40 mg QD, Jardiance 10 mg QD, mounjaro, synthroid 50 mcg, glargine 28U QD    Cardiac Rehab (Post PCI): Education on benefits of Cardiac Rehab provided to patient: Yes, Referral and Prescription Given for Cardiac Rehab: Yes, Pt given list of locations & instructed to contact their insurance company to review list of participating providers.   74 yo F with PMHx of HTN, hypothyroidism, DM2, Afib (on Xarelto, last dose 10/10), hypothyroidism, CKD stage IV (baseline Cr 2-2.2), sarcoidosis (in remission x30 years), and chronic back pain who presented to [facility] cardiac cath lab for high risk PCI/rota in light of known LAD disease and CCS class II symptoms.     Patient is now s/p cardiac cath 10/13/23: Rotational atherectomy/shockwave/ballooning and ROXIE x 1 pLAD (70-80%), ROXIE x 1 mLAD (70-80%), LM: MLI. Did not examined Right side this admission, No EDP. Access via right radial artery. Pt with hx of CKD (baseline Cr 2-2.2) with Cr 2.32 on admission. Pt received additional 250cc bolus followed by 180cc x 6hrs post procedure per fellow.     Pt seen and examined at bedside this AM without any complaints or events overnight, VSS, labs and telemetry reviewed and pt stable for discharge as discussed with Dr. Ortiz. Pt has received appropriate discharge instructions, including medication regimen, access site management and follow up with Dr. Urrutia in 1-2 weeks.    Discharge medications: xarelto 15 mg QD, Plavix 75mg QD, lopressor 50 mg BID, lasix 20 mg QD, ramipril 10 mg QD, lipitor 40 mg QD, Jardiance 10 mg QD, mounjaro, synthroid 50 mcg, glargine 28U QD    Cardiac Rehab (Post PCI): Education on benefits of Cardiac Rehab provided to patient: Yes, Referral and Prescription Given for Cardiac Rehab: Yes, Pt given list of locations & instructed to contact their insurance company to review list of participating providers.

## 2023-10-14 ENCOUNTER — TRANSCRIPTION ENCOUNTER (OUTPATIENT)
Age: 73
End: 2023-10-14

## 2023-10-14 VITALS
RESPIRATION RATE: 17 BRPM | OXYGEN SATURATION: 100 % | HEART RATE: 63 BPM | SYSTOLIC BLOOD PRESSURE: 115 MMHG | DIASTOLIC BLOOD PRESSURE: 56 MMHG

## 2023-10-14 LAB
ANION GAP SERPL CALC-SCNC: 8 MMOL/L — SIGNIFICANT CHANGE UP (ref 5–17)
BUN SERPL-MCNC: 24 MG/DL — HIGH (ref 7–23)
CALCIUM SERPL-MCNC: 8.9 MG/DL — SIGNIFICANT CHANGE UP (ref 8.4–10.5)
CHLORIDE SERPL-SCNC: 112 MMOL/L — HIGH (ref 96–108)
CO2 SERPL-SCNC: 21 MMOL/L — LOW (ref 22–31)
CREAT SERPL-MCNC: 1.86 MG/DL — HIGH (ref 0.5–1.3)
EGFR: 28 ML/MIN/1.73M2 — LOW
GLUCOSE BLDC GLUCOMTR-MCNC: 87 MG/DL — SIGNIFICANT CHANGE UP (ref 70–99)
GLUCOSE BLDC GLUCOMTR-MCNC: 97 MG/DL — SIGNIFICANT CHANGE UP (ref 70–99)
GLUCOSE SERPL-MCNC: 75 MG/DL — SIGNIFICANT CHANGE UP (ref 70–99)
HCT VFR BLD CALC: 29.9 % — LOW (ref 34.5–45)
HCV AB S/CO SERPL IA: 0.04 S/CO — SIGNIFICANT CHANGE UP
HCV AB SERPL-IMP: SIGNIFICANT CHANGE UP
HGB BLD-MCNC: 9.3 G/DL — LOW (ref 11.5–15.5)
MAGNESIUM SERPL-MCNC: 2.4 MG/DL — SIGNIFICANT CHANGE UP (ref 1.6–2.6)
MCHC RBC-ENTMCNC: 30.2 PG — SIGNIFICANT CHANGE UP (ref 27–34)
MCHC RBC-ENTMCNC: 31.1 GM/DL — LOW (ref 32–36)
MCV RBC AUTO: 97.1 FL — SIGNIFICANT CHANGE UP (ref 80–100)
NRBC # BLD: 0 /100 WBCS — SIGNIFICANT CHANGE UP (ref 0–0)
PLATELET # BLD AUTO: 113 K/UL — LOW (ref 150–400)
POTASSIUM SERPL-MCNC: 4.9 MMOL/L — SIGNIFICANT CHANGE UP (ref 3.5–5.3)
POTASSIUM SERPL-SCNC: 4.9 MMOL/L — SIGNIFICANT CHANGE UP (ref 3.5–5.3)
RBC # BLD: 3.08 M/UL — LOW (ref 3.8–5.2)
RBC # FLD: 13.6 % — SIGNIFICANT CHANGE UP (ref 10.3–14.5)
SODIUM SERPL-SCNC: 141 MMOL/L — SIGNIFICANT CHANGE UP (ref 135–145)
WBC # BLD: 7.08 K/UL — SIGNIFICANT CHANGE UP (ref 3.8–10.5)
WBC # FLD AUTO: 7.08 K/UL — SIGNIFICANT CHANGE UP (ref 3.8–10.5)

## 2023-10-14 PROCEDURE — C1761: CPT

## 2023-10-14 PROCEDURE — 85610 PROTHROMBIN TIME: CPT

## 2023-10-14 PROCEDURE — 93005 ELECTROCARDIOGRAM TRACING: CPT

## 2023-10-14 PROCEDURE — 80053 COMPREHEN METABOLIC PANEL: CPT

## 2023-10-14 PROCEDURE — C1887: CPT

## 2023-10-14 PROCEDURE — 82962 GLUCOSE BLOOD TEST: CPT

## 2023-10-14 PROCEDURE — 82550 ASSAY OF CK (CPK): CPT

## 2023-10-14 PROCEDURE — 92933 PRQ TRLML C ATHRC ST ANGIOP1: CPT

## 2023-10-14 PROCEDURE — 85025 COMPLETE CBC W/AUTO DIFF WBC: CPT

## 2023-10-14 PROCEDURE — 85027 COMPLETE CBC AUTOMATED: CPT

## 2023-10-14 PROCEDURE — 93458 L HRT ARTERY/VENTRICLE ANGIO: CPT

## 2023-10-14 PROCEDURE — 80048 BASIC METABOLIC PNL TOTAL CA: CPT

## 2023-10-14 PROCEDURE — 99233 SBSQ HOSP IP/OBS HIGH 50: CPT

## 2023-10-14 PROCEDURE — C1769: CPT

## 2023-10-14 PROCEDURE — 86803 HEPATITIS C AB TEST: CPT

## 2023-10-14 PROCEDURE — 83735 ASSAY OF MAGNESIUM: CPT

## 2023-10-14 PROCEDURE — 0715T: CPT

## 2023-10-14 PROCEDURE — 80061 LIPID PANEL: CPT

## 2023-10-14 PROCEDURE — C1874: CPT

## 2023-10-14 PROCEDURE — 92978 ENDOLUMINL IVUS OCT C 1ST: CPT

## 2023-10-14 PROCEDURE — 83036 HEMOGLOBIN GLYCOSYLATED A1C: CPT

## 2023-10-14 PROCEDURE — 82553 CREATINE MB FRACTION: CPT

## 2023-10-14 PROCEDURE — 85730 THROMBOPLASTIN TIME PARTIAL: CPT

## 2023-10-14 PROCEDURE — C1725: CPT

## 2023-10-14 PROCEDURE — C1724: CPT

## 2023-10-14 PROCEDURE — C1894: CPT

## 2023-10-14 PROCEDURE — 36415 COLL VENOUS BLD VENIPUNCTURE: CPT

## 2023-10-14 PROCEDURE — C1753: CPT

## 2023-10-14 PROCEDURE — 82803 BLOOD GASES ANY COMBINATION: CPT

## 2023-10-14 RX ORDER — RIVAROXABAN 15 MG-20MG
1 KIT ORAL
Qty: 30 | Refills: 11
Start: 2023-10-14 | End: 2024-10-07

## 2023-10-14 RX ORDER — AMLODIPINE BESYLATE 2.5 MG/1
1 TABLET ORAL
Qty: 30 | Refills: 3
Start: 2023-10-14 | End: 2024-02-10

## 2023-10-14 RX ORDER — ACETAMINOPHEN 500 MG
650 TABLET ORAL ONCE
Refills: 0 | Status: COMPLETED | OUTPATIENT
Start: 2023-10-14 | End: 2023-10-14

## 2023-10-14 RX ORDER — CLOPIDOGREL BISULFATE 75 MG/1
1 TABLET, FILM COATED ORAL
Qty: 30 | Refills: 11
Start: 2023-10-14 | End: 2024-10-07

## 2023-10-14 RX ADMIN — AMLODIPINE BESYLATE 5 MILLIGRAM(S): 2.5 TABLET ORAL at 06:01

## 2023-10-14 RX ADMIN — Medication 50 MICROGRAM(S): at 06:01

## 2023-10-14 RX ADMIN — Medication 50 MILLIGRAM(S): at 06:01

## 2023-10-14 RX ADMIN — RIVAROXABAN 15 MILLIGRAM(S): KIT at 13:22

## 2023-10-14 RX ADMIN — Medication 650 MILLIGRAM(S): at 08:33

## 2023-10-14 RX ADMIN — OXYCODONE HYDROCHLORIDE 10 MILLIGRAM(S): 5 TABLET ORAL at 00:03

## 2023-10-14 RX ADMIN — LISINOPRIL 40 MILLIGRAM(S): 2.5 TABLET ORAL at 06:01

## 2023-10-14 RX ADMIN — CLOPIDOGREL BISULFATE 75 MILLIGRAM(S): 75 TABLET, FILM COATED ORAL at 13:24

## 2023-10-14 RX ADMIN — Medication 650 MILLIGRAM(S): at 09:25

## 2023-10-14 NOTE — DISCHARGE NOTE NURSING/CASE MANAGEMENT/SOCIAL WORK - PATIENT PORTAL LINK FT
You can access the FollowMyHealth Patient Portal offered by [facility] by registering at the following website: [email or web address]. By joining [employer]’s FollowMyHealth portal, you will also be able to view your health information using other applications (apps) compatible with our system. You can access the FollowMyHealth Patient Portal offered by [facility] by registering at the following website: [email or web address]. By joining [employer]’s FollowMyHealth portal, you will also be able to view your health information using other applications (apps) compatible with our system. You can access the FollowMyHealth Patient Portal offered by [facility] by registering at the following website: [email or web address]. By joining [employer]’s FollowMyHealth portal, you will also be able to view your health information using other applications (apps) compatible with our system.

## 2023-10-14 NOTE — DISCHARGE NOTE NURSING/CASE MANAGEMENT/SOCIAL WORK - NSDCPEFALRISK_GEN_ALL_CORE
For information on Fall & Injury Prevention, visit: [email or web address] OR  [email or web address] OR  https://www.cdc.gov/steadi/patient.html For information on Fall & Injury Prevention, visit: [email or web address] OR  [email or web address] OR  https://www.cdc.gov/steadi/patient.html For information on Fall & Injury Prevention, visit: [email or web address] OR  [email or web address] OR  https://www.cdc.gov/steadi/patient.html

## 2023-10-25 DIAGNOSIS — N18.4 CHRONIC KIDNEY DISEASE, STAGE 4 (SEVERE): ICD-10-CM

## 2023-10-25 DIAGNOSIS — Z79.01 LONG TERM (CURRENT) USE OF ANTICOAGULANTS: ICD-10-CM

## 2023-10-25 DIAGNOSIS — E03.9 HYPOTHYROIDISM, UNSPECIFIED: ICD-10-CM

## 2023-10-25 DIAGNOSIS — E11.22 TYPE 2 DIABETES MELLITUS WITH DIABETIC CHRONIC KIDNEY DISEASE: ICD-10-CM

## 2023-10-25 DIAGNOSIS — Z79.84 LONG TERM (CURRENT) USE OF ORAL HYPOGLYCEMIC DRUGS: ICD-10-CM

## 2023-10-25 DIAGNOSIS — I25.110 ATHEROSCLEROTIC HEART DISEASE OF NATIVE CORONARY ARTERY WITH UNSTABLE ANGINA PECTORIS: ICD-10-CM

## 2023-10-25 DIAGNOSIS — I48.0 PAROXYSMAL ATRIAL FIBRILLATION: ICD-10-CM

## 2023-10-25 DIAGNOSIS — Z79.890 HORMONE REPLACEMENT THERAPY: ICD-10-CM

## 2023-10-25 DIAGNOSIS — Z91.09 OTHER ALLERGY STATUS, OTHER THAN TO DRUGS AND BIOLOGICAL SUBSTANCES: ICD-10-CM

## 2023-10-25 DIAGNOSIS — Z79.4 LONG TERM (CURRENT) USE OF INSULIN: ICD-10-CM

## 2023-10-25 DIAGNOSIS — D86.9 SARCOIDOSIS, UNSPECIFIED: ICD-10-CM

## 2023-10-25 DIAGNOSIS — I12.9 HYPERTENSIVE CHRONIC KIDNEY DISEASE WITH STAGE 1 THROUGH STAGE 4 CHRONIC KIDNEY DISEASE, OR UNSPECIFIED CHRONIC KIDNEY DISEASE: ICD-10-CM

## 2023-12-29 RX ORDER — INSULIN GLARGINE 100 [IU]/ML
28 INJECTION, SOLUTION SUBCUTANEOUS
Refills: 0 | DISCHARGE

## 2023-12-29 RX ORDER — OXYCODONE HYDROCHLORIDE 5 MG/1
1 TABLET ORAL
Refills: 0 | DISCHARGE

## 2023-12-29 RX ORDER — CHOLECALCIFEROL (VITAMIN D3) 125 MCG
1 CAPSULE ORAL
Refills: 0 | DISCHARGE

## 2023-12-29 RX ORDER — RAMIPRIL 5 MG
1 CAPSULE ORAL
Refills: 0 | DISCHARGE

## 2023-12-29 RX ORDER — LEVOTHYROXINE SODIUM 125 MCG
1 TABLET ORAL
Refills: 0 | DISCHARGE

## 2023-12-29 RX ORDER — TIRZEPATIDE 15 MG/.5ML
2.5 INJECTION, SOLUTION SUBCUTANEOUS
Refills: 0 | DISCHARGE

## 2023-12-29 RX ORDER — METOPROLOL TARTRATE 50 MG
1 TABLET ORAL
Refills: 0 | DISCHARGE

## 2023-12-29 RX ORDER — ATORVASTATIN CALCIUM 80 MG/1
1 TABLET, FILM COATED ORAL
Refills: 0 | DISCHARGE

## 2023-12-29 RX ORDER — FUROSEMIDE 40 MG
1 TABLET ORAL
Refills: 0 | DISCHARGE

## 2023-12-29 RX ORDER — RIVAROXABAN 15 MG-20MG
1 KIT ORAL
Refills: 0 | DISCHARGE

## 2023-12-29 RX ORDER — EMPAGLIFLOZIN 10 MG/1
1 TABLET, FILM COATED ORAL
Refills: 0 | DISCHARGE
